# Patient Record
Sex: FEMALE | Race: BLACK OR AFRICAN AMERICAN | NOT HISPANIC OR LATINO | Employment: FULL TIME | URBAN - METROPOLITAN AREA
[De-identification: names, ages, dates, MRNs, and addresses within clinical notes are randomized per-mention and may not be internally consistent; named-entity substitution may affect disease eponyms.]

---

## 2017-11-27 ENCOUNTER — HOSPITAL ENCOUNTER (EMERGENCY)
Facility: HOSPITAL | Age: 52
Discharge: HOME | End: 2017-11-27
Attending: EMERGENCY MEDICINE

## 2017-11-27 VITALS
DIASTOLIC BLOOD PRESSURE: 70 MMHG | SYSTOLIC BLOOD PRESSURE: 115 MMHG | WEIGHT: 170 LBS | BODY MASS INDEX: 31.28 KG/M2 | HEART RATE: 86 BPM | RESPIRATION RATE: 16 BRPM | TEMPERATURE: 99 F | OXYGEN SATURATION: 100 % | HEIGHT: 62 IN

## 2017-11-27 DIAGNOSIS — M54.2 NECK PAIN, ACUTE: Primary | ICD-10-CM

## 2017-11-27 PROCEDURE — 99283 EMERGENCY DEPT VISIT LOW MDM: CPT | Mod: ,,, | Performed by: EMERGENCY MEDICINE

## 2017-11-27 PROCEDURE — 99283 EMERGENCY DEPT VISIT LOW MDM: CPT

## 2017-11-27 PROCEDURE — 25000003 PHARM REV CODE 250: Performed by: EMERGENCY MEDICINE

## 2017-11-27 RX ORDER — NAPROXEN 500 MG/1
500 TABLET ORAL
Status: COMPLETED | OUTPATIENT
Start: 2017-11-27 | End: 2017-11-27

## 2017-11-27 RX ORDER — NAPROXEN 500 MG/1
500 TABLET ORAL 2 TIMES DAILY WITH MEALS
Qty: 30 TABLET | Refills: 0 | Status: SHIPPED | OUTPATIENT
Start: 2017-11-27 | End: 2017-12-05 | Stop reason: ALTCHOICE

## 2017-11-27 RX ORDER — CYCLOBENZAPRINE HCL 10 MG
10 TABLET ORAL 3 TIMES DAILY PRN
Qty: 15 TABLET | Refills: 0 | Status: SHIPPED | OUTPATIENT
Start: 2017-11-27 | End: 2017-12-02

## 2017-11-27 RX ORDER — KETOROLAC TROMETHAMINE 30 MG/ML
30 INJECTION, SOLUTION INTRAMUSCULAR; INTRAVENOUS
Status: DISCONTINUED | OUTPATIENT
Start: 2017-11-27 | End: 2017-11-27

## 2017-11-27 RX ORDER — CYCLOBENZAPRINE HCL 10 MG
10 TABLET ORAL
Status: COMPLETED | OUTPATIENT
Start: 2017-11-27 | End: 2017-11-27

## 2017-11-27 RX ADMIN — NAPROXEN 500 MG: 500 TABLET ORAL at 10:11

## 2017-11-27 RX ADMIN — CYCLOBENZAPRINE HYDROCHLORIDE 10 MG: 10 TABLET, FILM COATED ORAL at 10:11

## 2017-11-28 NOTE — ED PROVIDER NOTES
Encounter Date: 11/27/2017       History     Chief Complaint   Patient presents with    Shoulder Pain     pt reports she was in the elevator about 30 minutes ago in the garage and the elevator dropped and jolted and pt c/c of left sided shoulder pain and right sided neck pain; no bleeding noted      52-year-old female with no past medical history presents for the evaluation of neck pain. Patient was in a parking garage elevator at her workplace when the elevator malfunctioned and dropped approximately 1 floor.  Upon elevator stopping her neck flexed and now she is having some left-sided neck pain.  Pain is located in the left trapezius and left paraspinous neck muscles. She denies any numbness or tingling in either of her arms.he denies hitting her head, loss of consciousness, or headache.  She has no other bodily pain.  Despite the chief complaint triaging shoulder pain, the patient does not have any shoulder pain.          Review of patient's allergies indicates:  Allergies not on file  History reviewed. No pertinent past medical history.  Past Surgical History:   Procedure Laterality Date    cyst removed Right      History reviewed. No pertinent family history.  Social History   Substance Use Topics    Smoking status: Never Smoker    Smokeless tobacco: Never Used    Alcohol use Yes      Comment: ocassional     Review of Systems   Constitutional: Negative for fever.   HENT: Negative for sore throat.    Respiratory: Negative for shortness of breath.    Cardiovascular: Negative for chest pain.   Gastrointestinal: Negative for nausea.   Genitourinary: Negative for dysuria.   Musculoskeletal: Positive for neck pain and neck stiffness. Negative for back pain.   Skin: Negative for rash.   Neurological: Negative for weakness.   Hematological: Does not bruise/bleed easily.       Physical Exam     Initial Vitals [11/27/17 1920]   BP Pulse Resp Temp SpO2   115/70 86 16 98.7 °F (37.1 °C) 100 %      MAP       85          Physical Exam    Vitals reviewed.  Constitutional: She appears well-developed and well-nourished. She is not diaphoretic. No distress.   HENT:   Head: Normocephalic and atraumatic.   Right Ear: External ear normal.   Left Ear: External ear normal.   Nose: Nose normal.   Eyes: Right eye exhibits no discharge. Left eye exhibits no discharge.   Neck: Neck supple. No thyromegaly present. No tracheal deviation present.   + TTP to the left trapezius and left paraspinous muscles. No C-spine tenderness. Full ROM of neck, but slow to move due to muscle pain/ stiffness   Cardiovascular: Normal rate, regular rhythm and normal heart sounds.   No murmur heard.  Pulmonary/Chest: Breath sounds normal. No stridor. No respiratory distress. She has no wheezes. She has no rales.   Abdominal: There is no tenderness.   Musculoskeletal: Normal range of motion. She exhibits tenderness. She exhibits no edema.   Full passive and active ROM ofn L and R  shoulder.  No midline back tenderness.    Neurological: She is alert and oriented to person, place, and time. She has normal strength. No cranial nerve deficit.   Normal gait, normal strength at 4 extremities.  Sensation and  strength intact in the left arm.   Skin: Skin is warm and dry. No erythema. No pallor.   Psychiatric: She has a normal mood and affect. Thought content normal.         ED Course   Procedures  Labs Reviewed - No data to display          Medical Decision Making:   Initial Assessment:   52-year-old female presents for evaluation of neck pain after an elevator incident  Differential Diagnosis:   Strain, radiculopathy, muscle pain, hyperflexion injury  ED Management:  Will treat pain Toradol and Flexeril.  Benefits and risk of not having imaging have discussed the patient.  She is comfortable not having any imaging done. Will discharge with rx for nsaids and flexeril. Pt is comfortable with this plan.                 Attending Attestation:   Physician Attestation  Statement for Resident:  As the supervising MD   Physician Attestation Statement: I have personally seen and examined this patient.   I agree with the above history. -: Left trapezius spasm status post fall on elevator.  Patient with no midline tenderness and no weakness to suggest fracture or radicular symptoms.  No carotid bruit or cranial nerve defects to suggest carotid dissection.  Overall very well-appearing.  Provided with indications to return.   As the supervising MD I agree with the above PE.    As the supervising MD I agree with the above treatment, course, plan, and disposition.                    ED Course      Clinical Impression:   The encounter diagnosis was Neck pain, acute.                           Steven Machuca MD  Resident  11/27/17 2202       Chad Hansen MD  11/27/17 2225

## 2017-12-01 ENCOUNTER — HOSPITAL ENCOUNTER (EMERGENCY)
Facility: HOSPITAL | Age: 52
Discharge: HOME | End: 2017-12-01
Attending: EMERGENCY MEDICINE
Payer: COMMERCIAL

## 2017-12-01 VITALS
BODY MASS INDEX: 31.28 KG/M2 | HEART RATE: 79 BPM | HEIGHT: 62 IN | DIASTOLIC BLOOD PRESSURE: 67 MMHG | RESPIRATION RATE: 17 BRPM | TEMPERATURE: 99 F | OXYGEN SATURATION: 98 % | SYSTOLIC BLOOD PRESSURE: 126 MMHG | WEIGHT: 170 LBS

## 2017-12-01 DIAGNOSIS — M25.551 ACUTE RIGHT HIP PAIN: ICD-10-CM
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
## 2017-12-01 DIAGNOSIS — S16.1XXD CERVICAL STRAIN, ACUTE, SUBSEQUENT ENCOUNTER: Primary | ICD-10-CM
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
## 2017-12-01 LAB
B-HCG UR QL: NEGATIVE
BACTERIA #/AREA URNS AUTO: NORMAL /HPF
BILIRUB UR QL STRIP: NEGATIVE
CLARITY UR REFRACT.AUTO: CLEAR
COLOR UR AUTO: ABNORMAL
CTP QC/QA: YES
GLUCOSE UR QL STRIP: NEGATIVE
HGB UR QL STRIP: ABNORMAL
KETONES UR QL STRIP: NEGATIVE
LEUKOCYTE ESTERASE UR QL STRIP: NEGATIVE
MICROSCOPIC COMMENT: NORMAL
NITRITE UR QL STRIP: NEGATIVE
PH UR STRIP: 7 [PH] (ref 5–8)
PROT UR QL STRIP: NEGATIVE
RBC #/AREA URNS AUTO: 1 /HPF (ref 0–4)
SP GR UR STRIP: 1 (ref 1–1.03)
SQUAMOUS #/AREA URNS AUTO: 4 /HPF
URN SPEC COLLECT METH UR: ABNORMAL
UROBILINOGEN UR STRIP-ACNC: NEGATIVE EU/DL
WBC #/AREA URNS AUTO: 1 /HPF (ref 0–5)

## 2017-12-01 PROCEDURE — 81001 URINALYSIS AUTO W/SCOPE: CPT

## 2017-12-01 PROCEDURE — 63600175 PHARM REV CODE 636 W HCPCS: Performed by: PHYSICIAN ASSISTANT

## 2017-12-01 PROCEDURE — 96372 THER/PROPH/DIAG INJ SC/IM: CPT

## 2017-12-01 PROCEDURE — 99284 EMERGENCY DEPT VISIT MOD MDM: CPT | Mod: ,,, | Performed by: PHYSICIAN ASSISTANT

## 2017-12-01 PROCEDURE — 81025 URINE PREGNANCY TEST: CPT | Performed by: EMERGENCY MEDICINE

## 2017-12-01 PROCEDURE — 99283 EMERGENCY DEPT VISIT LOW MDM: CPT | Mod: 25

## 2017-12-01 RX ORDER — ORPHENADRINE CITRATE 30 MG/ML
30 INJECTION INTRAMUSCULAR; INTRAVENOUS
Status: COMPLETED | OUTPATIENT
Start: 2017-12-01 | End: 2017-12-01

## 2017-12-01 RX ORDER — KETOROLAC TROMETHAMINE 30 MG/ML
10 INJECTION, SOLUTION INTRAMUSCULAR; INTRAVENOUS
Status: COMPLETED | OUTPATIENT
Start: 2017-12-01 | End: 2017-12-01

## 2017-12-01 RX ADMIN — ORPHENADRINE CITRATE 30 MG: 30 INJECTION INTRAMUSCULAR; INTRAVENOUS at 03:12

## 2017-12-01 RX ADMIN — KETOROLAC TROMETHAMINE 10 MG: 30 INJECTION, SOLUTION INTRAMUSCULAR at 03:12

## 2017-12-01 NOTE — ED TRIAGE NOTES
"Pt presents to the ED c/o bilateral hip pain since Monday. Pt states that she was at work "when the elevator dropped down from the eighth floor." Unsure how far it dropped. Pt states she was seen here Monday for her shoulders. Pt states xrays were done on Wednesday at her company's doctor who stated it was probably pain r/t the impact but nothing broken. Denies bladder/bowel incontinence.  "

## 2017-12-01 NOTE — ED PROVIDER NOTES
Encounter Date: 12/1/2017    SCRIBE #1 NOTE: I, Fay Thacker, am scribing for, and in the presence of,  Dr. Ayers. I have scribed the following portions of the note - the APC attestation.       History     Chief Complaint   Patient presents with    Back Pain     Pt presented to the ED via pov. Pt states on Monday she was at work when she got stuck in the elevator and the elevator dropped down. Pt c/o back pain. Pt alert. Pt denies loss of bladder and bowel control.     The patient presents to the ER c/o left sided neck pain and right hip pain. She states that 4 days ago she was onboard a parking garage elevator that malfunctioned and unexpectedly dropped approximately 1 floor to a sudden stop. She denies falling down, but describes being jolted. She states that she initially experienced left sided neck pain and came to the ER for evaluation. She states that she was diagnosed with muscle strain and prescribed NSAIDs and a muscle relaxer. She states that afterwards she gradually developed pain and soreness to her right hip. She states that she has been taking the medications, but her pains are uncontrolled. She denies any back pain. She denies any radiation of her pain. She denies any numbness, weakness, or loss of function. She states that the pain is worse with certain movements and postions.           Review of patient's allergies indicates:   Allergen Reactions    Codeine Itching     History reviewed. No pertinent past medical history.  Past Surgical History:   Procedure Laterality Date    cyst removed Right      History reviewed. No pertinent family history.  Social History   Substance Use Topics    Smoking status: Never Smoker    Smokeless tobacco: Never Used    Alcohol use Yes      Comment: ocassional     Review of Systems   Constitutional: Negative for chills and fever.   Respiratory: Negative for shortness of breath.    Cardiovascular: Negative for chest pain.   Gastrointestinal: Negative for  abdominal pain, diarrhea, nausea and vomiting.   Genitourinary: Negative for decreased urine volume, dysuria, flank pain, frequency, hematuria and pelvic pain.   Musculoskeletal: Positive for arthralgias and neck pain. Negative for back pain and joint swelling.   Skin: Negative for color change and wound.   Neurological: Negative for dizziness, syncope, weakness, light-headedness, numbness and headaches.   Psychiatric/Behavioral: Negative for confusion.       Physical Exam     Initial Vitals [12/01/17 1258]   BP Pulse Resp Temp SpO2   119/75 93 17 98.5 °F (36.9 °C) 98 %      MAP       89.67         Physical Exam    Nursing note and vitals reviewed.  Constitutional: She appears well-developed and well-nourished.   Alert and ambulatory. She is accompanied by her .    HENT:   Head: Atraumatic.   Eyes: Conjunctivae are normal.   Neck:   Left Trapezius and left cervical paraspinal muscle tenderness, diffuse, mild. No midline or focal vertebral point tenderness.    Cardiovascular: Normal rate and intact distal pulses.   Pulmonary/Chest: No respiratory distress.   Abdominal: Soft. She exhibits no distension.   Musculoskeletal:   Palpation over right SI joint reproduces pain. Flexion of right hip reproduces pain. No L-spine or T-spine tenderness.     Neurological: She is alert and oriented to person, place, and time. She has normal strength. No cranial nerve deficit or sensory deficit.   5/5 strength extremities x 4. No focal weakness or deficit.    Skin: Skin is warm and dry. No rash noted.   Psychiatric: She has a normal mood and affect. Her behavior is normal.         ED Course   Procedures  Labs Reviewed   URINALYSIS - Abnormal; Notable for the following:        Result Value    Occult Blood UA 2+ (*)     All other components within normal limits    Narrative:     EXTRA URINE   URINALYSIS MICROSCOPIC    Narrative:     EXTRA URINE   POCT URINE PREGNANCY     Results for orders placed or performed during the hospital  encounter of 12/01/17   Urinalysis   Result Value Ref Range    Specimen UA Urine, Clean Catch     Color, UA Straw Yellow, Straw, Kailyn    Appearance, UA Clear Clear    pH, UA 7.0 5.0 - 8.0    Specific Gravity, UA 1.005 1.005 - 1.030    Protein, UA Negative Negative    Glucose, UA Negative Negative    Ketones, UA Negative Negative    Bilirubin (UA) Negative Negative    Occult Blood UA 2+ (A) Negative    Nitrite, UA Negative Negative    Urobilinogen, UA Negative <2.0 EU/dL    Leukocytes, UA Negative Negative   Urinalysis Microscopic   Result Value Ref Range    RBC, UA 1 0 - 4 /hpf    WBC, UA 1 0 - 5 /hpf    Bacteria, UA Rare None-Occ /hpf    Squam Epithel, UA 4 /hpf    Microscopic Comment SEE COMMENT    POCT urine pregnancy   Result Value Ref Range    POC Preg Test, Ur Negative Negative     Acceptable Yes                Medical Decision Making:   History:   Old Medical Records: I decided to obtain old medical records.  Initial Assessment:   Pt c/o left neck pain and right hip pain 4 days s/p traumatic injury due to elevator malfunction.   Differential Diagnosis:   Muscle strain, sprain, fracture, tendonitis, fracture, dislocation, Radiculopathy, Bursitis, neuropathy, inflammation, infection, etc   Clinical Tests:   Radiological Study: Ordered and Reviewed  Other:   I have discussed this case with another health care provider.       <> Summary of the Discussion: I discussed the case in detail with the ER attending physician.     Additional MDM:   X-Rays: I have independently interpreted X-Ray(s) - see notes.     Imaging Results          X-Ray Cervical Spine AP And Lateral (Final result)  Result time 12/01/17 14:01:39    Final result by Albino Ch MD (12/01/17 14:01:39)                 Impression:      Cervical spondylosis.  No evidence for acute fracture, bone destruction, or dislocation.      Electronically signed by: ALBINO CH MD  Date:     12/01/17  Time:    14:01              Narrative:     AP, lateral, and odontoid views of the cervical spine were obtained. There is straightening of the normal cervical lordosis. Posterior vertebral alignment is satisfactory. Vertebral body heights are well-maintained. There is no evidence for acute fracture or bone destruction. There is disc space narrowing at the C5-6 and C6-7 levels with anterior osteophyte formation. The odontoid appears intact. Prevertebral soft tissues are unremarkable.                             X-Ray Sacroiliac Joints 3 Views (Final result)  Result time 12/01/17 14:00:33    Final result by Dary Justin MD (12/01/17 14:00:33)                 Impression:        No acute displaced fracture or dislocation identified.      Electronically signed by: DARY JUSTIN MD, MD  Date:     12/01/17  Time:    14:00              Narrative:    COMPARISON: Right hip series same day    FINDINGS: 3 views right SI joint series.        Overall alignment is within normal limits.   No acute displaced fracture, dislocation, or destructive osseous process identified.  Minimal degenerative change at the pubic symphysis and bilateral SI joints.   No subcutaneous emphysema or radiodense retained foreign body. Pelvic phleboliths noted.                             X-Ray Hip 2 View Right (Final result)  Result time 12/01/17 13:59:56    Final result by Albino Villalobos MD (12/01/17 13:59:56)                 Impression:      No evidence for acute fracture, bone destruction, or dislocation.      Electronically signed by: ALBINO VILLALOBOS MD  Date:     12/01/17  Time:    13:59              Narrative:    AP radiograph of the pelvis and a frog leg lateral radiograph of the right hip were obtained. The bones are intact. There is no evidence for acute fracture or bone destruction. Hip joints and sacroiliac joints appear well-maintained. There are phleboliths within the pelvis. Soft tissues are otherwise unremarkable.                                   Scribe Attestation:   Scribe #1:  I performed the above scribed service and the documentation accurately describes the services I performed. I attest to the accuracy of the note.    Attending Attestation:     Physician Attestation Statement for NP/PA:   I discussed this assessment and plan of this patient with the NP/PA, but I did not personally examine the patient. The face to face encounter was performed by the NP/PA.    Other NP/PA Attestation Additions:      Medical Decision Making: Pt with back pain.                 ED Course      Clinical Impression:   The primary encounter diagnosis was Cervical strain, acute, subsequent encounter. A diagnosis of Acute right hip pain was also pertinent to this visit.    Disposition:   Disposition: Discharged  Condition: Stable                        Alfredo Rodríguez PA-C  12/01/17 1603

## 2017-12-05 ENCOUNTER — OFFICE VISIT (OUTPATIENT)
Dept: OCCUPATIONAL MEDICINE | Facility: CLINIC | Age: 52
End: 2017-12-05
Payer: COMMERCIAL

## 2017-12-05 VITALS
SYSTOLIC BLOOD PRESSURE: 130 MMHG | BODY MASS INDEX: 31.28 KG/M2 | DIASTOLIC BLOOD PRESSURE: 90 MMHG | TEMPERATURE: 98 F | HEIGHT: 62 IN | HEART RATE: 92 BPM | WEIGHT: 170 LBS | RESPIRATION RATE: 12 BRPM

## 2017-12-05 DIAGNOSIS — S73.101A HIP SPRAIN, RIGHT, INITIAL ENCOUNTER: ICD-10-CM

## 2017-12-05 DIAGNOSIS — S33.5XXA LUMBAR SPRAIN, INITIAL ENCOUNTER: Primary | ICD-10-CM

## 2017-12-05 DIAGNOSIS — S13.9XXA NECK SPRAIN, INITIAL ENCOUNTER: ICD-10-CM

## 2017-12-05 PROCEDURE — 99204 OFFICE O/P NEW MOD 45 MIN: CPT | Mod: S$GLB,,, | Performed by: PREVENTIVE MEDICINE

## 2017-12-05 RX ORDER — ORPHENADRINE CITRATE 100 MG/1
100 TABLET, EXTENDED RELEASE ORAL 2 TIMES DAILY
Qty: 60 TABLET | Refills: 1 | Status: SHIPPED | OUTPATIENT
Start: 2017-12-05 | End: 2018-12-05

## 2017-12-05 RX ORDER — MELOXICAM 7.5 MG/1
7.5 TABLET ORAL 2 TIMES DAILY WITH MEALS
Qty: 30 TABLET | Refills: 1 | Status: SHIPPED | OUTPATIENT
Start: 2017-12-05 | End: 2021-04-09

## 2017-12-05 RX ORDER — CYCLOBENZAPRINE HCL 10 MG
10 TABLET ORAL 3 TIMES DAILY PRN
COMMUNITY
End: 2021-03-31 | Stop reason: SDUPTHER

## 2017-12-05 NOTE — LETTER
[facility] [address] [place] [address]  Phone: [phone]  Fax: [phone]    Pt Name: Saadia Cancino  Injury Date: 11/27/17   Employee ID:  Date of Treatment: 12/05/2017   Company: [employer]            Appointment Time: 09:25 AM Arrived:  9:40 AM CST   Physician: Lars Villalpando MD DEPARTED: 11:30 AM           Office Treatment: Saadia was seen today for hip pain.    Diagnoses and all orders for this visit:  EXAM, RECORDS REVIEW, REGULAR DUTY    Lumbar sprain, initial encounter  -     orphenadrine (NORFLEX) 100 mg tablet; Take 1 tablet (100 mg total) by mouth 2 (two) times daily.  Neck sprain, initial encounter  -     meloxicam (MOBIC) 7.5 MG tablet; Take 1 tablet (7.5 mg total) by mouth 2 (two) times daily with meals.  Hip sprain, right, initial encounter     Patient Instructions: Daily home exercises/warm soaks (Both neck and back exercises demonstrated in the office)    Restrictions: NONE ; Regular Duty        Return Appointment: 12/12/2017 @ 1045

## 2017-12-05 NOTE — PROGRESS NOTES
Subjective:       Patient ID: Saadia Cancino is a 52 y.o. female.    Chief Complaint: Hip Pain (BILATERAL HIP PAIN FROM INJURY ON 11/27/17 )    Pt works in the [employer] department at [employer].  She was got in the elevator to go home on 11/27/17 on the first floor when the elevator shot up to the 8th floor and the doors cracked open then the elevator free fell then stopped.  She describes the incident as a jolt through her body.  She went to the ER on 11/27/17 and again on 12/01/17.  She reports pain in both hips and around her abdomen.  There is shooting pain when she lays down.  She say her neck is feeling better but is most concerned with the bilateral hip pain.  CT      Hip Pain    The incident occurred 5 to 7 days ago. The incident occurred at work. The pain is present in the left hip and right hip. The quality of the pain is described as aching, shooting, stabbing, cramping and burning. The pain is at a severity of 4/10. The pain is mild. The pain has been constant since onset. Associated symptoms include muscle weakness. She reports no foreign bodies present. The symptoms are aggravated by movement, palpation and weight bearing. She has tried NSAIDs, non-weight bearing and rest for the symptoms. The treatment provided no relief.     Review of Systems   Musculoskeletal: Positive for back pain, muscle cramps, muscle weakness, neck pain and stiffness.       Objective:      Physical Exam   Constitutional: She appears well-developed and well-nourished.   HENT:   Head: Normocephalic and atraumatic.   Eyes: Pupils are equal, round, and reactive to light.   Neck: Normal range of motion.   Cardiovascular: Normal rate.    Pulmonary/Chest: Effort normal.   Musculoskeletal:        Cervical back: She exhibits decreased range of motion, tenderness, pain and spasm. She exhibits no bony tenderness, no swelling, no edema, no deformity, no laceration and normal pulse.        Lumbar back: She exhibits  decreased range of motion, tenderness and pain. She exhibits no bony tenderness, no swelling, no edema, no deformity, no laceration, no spasm and normal pulse.        Back:    Pain about neck and back with palpation. Pain with flexion, extension and rotation of the neck.   Pain with flexion of low back on the right to 45, extension to 25 and lateral bends to 25 degrees. No motor or sensory deficits.   Neurological: She is alert.   No focal neurologic deficits   Skin: Skin is warm.   Psychiatric: She has a normal mood and affect. Her behavior is normal. Judgment and thought content normal.   Nursing note and vitals reviewed.      Assessment:       1. Lumbar sprain, initial encounter    2. Neck sprain, initial encounter    3. Hip sprain, right, initial encounter        Plan:           Medications Ordered This Encounter      meloxicam (MOBIC) 7.5 MG tablet          Sig: Take 1 tablet (7.5 mg total) by mouth 2 (two) times daily with meals.          Dispense:  30 tablet          Refill:  1      orphenadrine (NORFLEX) 100 mg tablet          Sig: Take 1 tablet (100 mg total) by mouth 2 (two) times daily.          Dispense:  60 tablet          Refill:  1  Patient Instructions: Daily home exercises/warm soaks (Both neck and back exercises demonstrated in the office)   Restrictions: Regular Duty  Return in about 7 days (around 12/12/2017).

## 2017-12-14 ENCOUNTER — OFFICE VISIT (OUTPATIENT)
Dept: OCCUPATIONAL MEDICINE | Facility: CLINIC | Age: 52
End: 2017-12-14
Payer: COMMERCIAL

## 2017-12-14 DIAGNOSIS — S33.5XXD LUMBAR SPRAIN, SUBSEQUENT ENCOUNTER: Primary | ICD-10-CM

## 2017-12-14 DIAGNOSIS — M54.16 LUMBAR BACK PAIN WITH RADICULOPATHY AFFECTING RIGHT LOWER EXTREMITY: ICD-10-CM

## 2017-12-14 DIAGNOSIS — M54.16 LUMBAR BACK PAIN WITH RADICULOPATHY AFFECTING LEFT LOWER EXTREMITY: ICD-10-CM

## 2017-12-14 DIAGNOSIS — S13.9XXD NECK SPRAIN, SUBSEQUENT ENCOUNTER: ICD-10-CM

## 2017-12-14 DIAGNOSIS — S76.011D HIP STRAIN, RIGHT, SUBSEQUENT ENCOUNTER: ICD-10-CM

## 2017-12-14 PROBLEM — S33.5XXA LUMBAR SPRAIN: Status: ACTIVE | Noted: 2017-12-14

## 2017-12-14 PROCEDURE — 99213 OFFICE O/P EST LOW 20 MIN: CPT | Mod: S$GLB,,, | Performed by: NURSE PRACTITIONER

## 2017-12-14 NOTE — PROGRESS NOTES
Subjective:       Patient ID: Saadia Cancino is a 52 y.o. female.    Chief Complaint: Hip Pain (FOLLOW UP FROM 11/27/17 ) and Shoulder Pain (FOLLOW UP FROM 11/27/17)    Pt is here for a FOLLOW UP on an injury that occurred on 11/27/17.  She states substantial improvement to her LEFT SHOULDER and NECK injury but very little improvement on her LEFT HIP / BACK/ ABDOMEN pain.  CT      Hip Pain    The incident occurred more than 1 week ago. The incident occurred at work. The pain is present in the left hip. The quality of the pain is described as aching and cramping. The pain is at a severity of 6/10. The pain is mild. The pain has been fluctuating since onset. Associated symptoms include numbness and tingling. She reports no foreign bodies present. The symptoms are aggravated by weight bearing and palpation. She has tried nothing for the symptoms. The treatment provided mild relief.   Shoulder Pain    The pain is present in the neck and left shoulder. This is a recurrent problem. The current episode started 1 to 4 weeks ago. The problem occurs intermittently. The problem has been waxing and waning. The quality of the pain is described as dull. The pain is at a severity of 3/10. The pain is mild. Associated symptoms include numbness, stiffness and tingling. Pertinent negatives include no fever. The treatment provided mild relief.     Review of Systems   Constitution: Negative for chills and fever.   Eyes: Negative for blurred vision, double vision and pain.   Cardiovascular: Negative for chest pain, dyspnea on exertion, leg swelling and palpitations.   Respiratory: Negative for cough and shortness of breath.    Endocrine: Negative for polydipsia, polyphagia and polyuria.   Hematologic/Lymphatic: Does not bruise/bleed easily.   Skin: Negative for color change and suspicious lesions.   Musculoskeletal: Positive for back pain, joint pain, muscle cramps, muscle weakness, neck pain and stiffness.   Genitourinary: Negative  for bladder incontinence, dysuria, flank pain and pelvic pain.   Neurological: Positive for numbness and tingling. Negative for difficulty with concentration, dizziness, light-headedness, sensory change and vertigo.   Psychiatric/Behavioral: Negative for depression and memory loss. The patient is not nervous/anxious.    Allergic/Immunologic: Negative for persistent infections.       Objective:      Physical Exam   Constitutional: She is oriented to person, place, and time. Vital signs are normal. She appears well-developed and well-nourished.   HENT:   Head: Normocephalic and atraumatic.   Eyes: Conjunctivae and EOM are normal. Pupils are equal, round, and reactive to light.   Neck: Neck supple. No muscular tenderness present. Normal range of motion present.   Cardiovascular: Normal rate and regular rhythm.    Pulmonary/Chest: Effort normal.   Abdominal: Soft. Bowel sounds are normal. There is no tenderness. There is no guarding.   Musculoskeletal: She exhibits tenderness.        Left shoulder: She exhibits tenderness.        Left elbow: Tenderness found.        Left wrist: Normal.        Right hip: She exhibits decreased range of motion, decreased strength and tenderness. She exhibits no swelling.        Left hip: She exhibits decreased range of motion, decreased strength and tenderness.        Cervical back: She exhibits decreased range of motion, tenderness and pain.        Thoracic back: Normal.        Lumbar back: She exhibits decreased range of motion, tenderness, pain and spasm. She exhibits no swelling.        Back:         Legs:  Neurological: She is alert and oriented to person, place, and time. She displays normal reflexes. No cranial nerve deficit or sensory deficit. She exhibits normal muscle tone. Coordination and gait (gait slow but steady) normal. GCS eye subscore is 4. GCS verbal subscore is 5. GCS motor subscore is 6.   Reflex Scores:       Patellar reflexes are 2+ on the right side and 2+ on the  left side.       Achilles reflexes are 2+ on the right side and 2+ on the left side.  Skin: Skin is warm and dry. Capillary refill takes less than 2 seconds.   Psychiatric: Her speech is normal and behavior is normal. Judgment and thought content normal. Cognition and memory are normal. She exhibits a depressed mood.   Very tearful when discussing injury        Assessment:       1. Lumbar sprain, subsequent encounter    2. Neck sprain, subsequent encounter    3. Hip strain, right, subsequent encounter    4. Lumbar back pain with radiculopathy affecting left lower extremity    5. Lumbar back pain with radiculopathy affecting right lower extremity        Plan:           Saadia was seen today for hip pain and shoulder pain.    Diagnoses and all orders for this visit:    Lumbar sprain, subsequent encounter    Neck sprain, subsequent encounter    Hip strain, right, subsequent encounter    Lumbar back pain with radiculopathy affecting left lower extremity    Lumbar back pain with radiculopathy affecting right lower extremity    Continue medication as directed and as needed  At home exercises as demonstrated and directed daily  Call with any questions and or concern  Consider Physical therapy next visit will discuss with Dr Villalpando.  Patient requesting a MRI of the lumbar spine- we reassess on 12/19/2017    Patient Instructions: Attention not to aggravate affected area, Daily home exercises/warm soaks   Restrictions: Sit or stand as needed, No lifting/pushing/pulling more than 10 lbs, Avoid frequent bending/lifting/twisting, Avoid climbing/kneeling/squatting  Return in about 5 days (around 12/19/2017).

## 2017-12-14 NOTE — LETTER
[facility]  [address] [address]  Phone: [phone]  Fax: [phone]    Pt Name: Saadia Cancino  Injury Date: 11/27/17   Employee ID:  Date of Treatment: 12/14/2017   Company: [employer] reference to record [employer] [id number]            Appointment Time: 02:45 PM Arrived:  3:00 PM CST   Physician: Bea Alcantar NP Departed: 1625 pm           Office Treatment: Saadia was seen today for hip pain and shoulder pain.    Diagnoses and all orders for this visit:  EXAM, LIGHT DUTY    Lumbar sprain, subsequent encounter  Neck sprain, subsequent encounter  Hip strain, right, subsequent encounter  Lumbar back pain with radiculopathy affecting left lower extremity  Lumbar back pain with radiculopathy affecting right lower extremity     Patient Instructions: Attention not to aggravate affected area, Daily home exercises/warm soaks    Restrictions: Sit or stand as needed, No lifting/pushing/pulling more than 10 lbs, Avoid frequent bending/lifting/twisting, Avoid climbing/kneeling/squatting       Return Appointment: 01/19/18 @ 1:00 PM

## 2017-12-14 NOTE — PATIENT INSTRUCTIONS
Relieving Back Pain  Back pain is a common problem. You can strain back muscles by lifting too much weight or just by moving the wrong way. Back strain can be uncomfortable, even painful. And it can take weeks or months to improve. To help yourself feel better and prevent future back strains, try these tips.  Important Note: Do not give aspirin to children or teens without first discussing it with your healthcare provider.      ? Ice    Ice reduces muscle pain and swelling. It helps most during the first 24 to 48 hours after an injury.  · Wrap an ice pack or a bag of frozen peas in a thin towel. (Never place ice directly on your skin.)  · Place the ice where your back hurts the most.  · Dont ice for more than 20 minutes at a time.  · You can use ice several times a day.  ? Medicines  Over-the-counter pain relievers can include acetaminophen and anti-inflammatory medicines, which includes aspirin or ibuprofen. They can help ease discomfort. Some also reduce swelling.  · Tell your healthcare provider about any medicines you are already taking.  · Take medicines only as directed.  ? Heat  After the first 48 hours, heat can relax sore muscles and improve blood flow.  · Try a warm bath or shower. Or use a heating pad set on low. To prevent a burn, keep a cloth between you and the heating pad.  · Dont use a heating pad for more than 15 minutes at a time. Never sleep on a heating pad.  Date Last Reviewed: 9/1/2015  © 8019-7006 [employer]. [address] [place] [address]. All rights reserved. This information is not intended as a substitute for professional medical care. Always follow your healthcare professional's instructions.        Back Safety: Poor Posture Hurts  An unhealthy spine often starts with bad habits. Poor movement patterns and posture problems are common causes of back pain. Disk, bone, nerve, and soft tissue problems can all be affected by poor posture. They can lead to pain,  stiffness, and other symptoms.    Poor posture backfires  Poor posture can cause pain. Too much slouching puts increased pressure on the disks. An excessive lumbar curve can overload and inflame the vertebrae. As a result, the back muscles may tighten or spasm to splint and protect the spine. This adds to the pain you feel.    Proper posture: The key to safe movement  Your spine bears your weight throughout the day. This is true whether youre sleeping, standing, or bending. Certain positions strain your spine more than others. But by maintaining proper posture in all positions, you can reduce the stress on your spine.       To improve your standing posture, follow these steps:  · Breathe deeply.  · Relax your shoulders, hips, and ankles. · Think of the ears, shoulders, hips, and ankles as a series of dots. Now, adjust your body to connect the dots in a straight line.  · Tuck your buttocks in just a bit if you need to.      Date Last Reviewed: 10/28/2015  © 6010-5763 [employer]. [address] [place] [address]. All rights reserved. This information is not intended as a substitute for professional medical care. Always follow your healthcare professional's instructions.        Hip Strain    You have a strain of the muscles around the hip joint. A muscle strain is a stretching or tearing of muscle fibers. This causes pain, especially when you move that muscle. There may also be some swelling and bruising.  Home care  · Stay off the injured leg as much as possible until you can walk on it without pain. If you have a lot of pain with walking, crutches or a walker may be prescribed. These can be rented or purchased at many pharmacies and surgical or orthopedic supply stores. Follow your healthcare provider's advice regarding when to begin putting weight on that leg.  · Apply an ice pack over the injured area for 15 to 20 minutes every 3 to 6 hours. You should do this for the first 24 to 48  hours. You can make an ice pack by filling a plastic bag that seals at the top with ice cubes and then wrapping it with a thin towel. Be careful not to injure your skin with the ice treatments. Ice should never be applied directly to skin. Continue the use of ice packs for relief of pain and swelling as needed. After 48 hours, apply heat (warm shower or warm bath) for 15 to 20 minutes several times a day, or alternate ice and heat.  · You may use over-the-counter pain medicine to control pain, unless another pain medicine was prescribed. If you have chronic liver or kidney disease or ever had a stomach ulcer or GI bleeding, talk with your healthcare provider beforeusing these medicines.  · If you play sports, you may resume these activities when you are able to hop and run on the injured leg without pain.  Follow-up care  Follow up with your healthcare provider, or as advised. If your symptoms do not begin to get better after a week, more tests may be needed.  If X-rays were taken, you will be told of any new findings that may affect your care.  When to seek medical advice  Call your healthcare provider right away if any of these occur:  · Increased swelling or bruising  · Increased pain  · Losing the ability to put weight on the injured side  Date Last Reviewed: 11/19/2015 © 2000-2017 [employer]. [address] [place] [address], [place] [address]. All rights reserved. This information is not intended as a substitute for professional medical care. Always follow your healthcare professional's instructions.        Back Safety: Sitting  Sitting can strain your back if you dont do it properly. Learn the right moves to protect your back.      Sitting down  Follow these steps to sit down. Reverse them to get back up.  · Make sure the chair is behind you.  · Place one foot slightly behind the other.  · Tighten your stomach muscles. Bend forward from the hips, keeping your back straight.  · Hold the armrests or  sides of the seat for support.  · Bend your knees. Use your leg muscles to lower yourself onto the seat.  · Scoot back in the seat until you are comfortable.    Sitting safely  · Keep your feet flat. Dont cross your legs.  · A low footrest (no higher than 8 inches) may help.  · A support behind your lower back or between your shoulder blades can help make you more comfortable.  · When sitting for long periods, change your position from time to time. Also, get up every half hour and move around.  Date Last Reviewed: 8/31/2015  © 3902-8654 [employer] [address] [place] [address], [place] [address]. All rights reserved. This information is not intended as a substitute for professional medical care. Always follow your healthcare professional's instructions.

## 2021-03-02 ENCOUNTER — TELEPHONE (OUTPATIENT)
Dept: HEMATOLOGY/ONCOLOGY | Facility: CLINIC | Age: 56
End: 2021-03-02

## 2021-03-20 ENCOUNTER — IMMUNIZATION (OUTPATIENT)
Dept: PRIMARY CARE CLINIC | Facility: CLINIC | Age: 56
End: 2021-03-20
Payer: COMMERCIAL

## 2021-03-20 DIAGNOSIS — Z23 NEED FOR VACCINATION: Primary | ICD-10-CM

## 2021-03-20 PROCEDURE — 91300 PR SARS-COV- 2 COVID-19 VACCINE, NO PRSV, 30MCG/0.3ML, IM: CPT | Mod: S$GLB,,, | Performed by: INTERNAL MEDICINE

## 2021-03-20 PROCEDURE — 0001A PR IMMUNIZ ADMIN, SARS-COV-2 COVID-19 VACC, 30MCG/0.3ML, 1ST DOSE: ICD-10-PCS | Mod: CV19,S$GLB,, | Performed by: INTERNAL MEDICINE

## 2021-03-20 PROCEDURE — 0001A PR IMMUNIZ ADMIN, SARS-COV-2 COVID-19 VACC, 30MCG/0.3ML, 1ST DOSE: CPT | Mod: CV19,S$GLB,, | Performed by: INTERNAL MEDICINE

## 2021-03-20 PROCEDURE — 91300 PR SARS-COV- 2 COVID-19 VACCINE, NO PRSV, 30MCG/0.3ML, IM: ICD-10-PCS | Mod: S$GLB,,, | Performed by: INTERNAL MEDICINE

## 2021-03-20 RX ADMIN — Medication 0.3 ML: at 08:03

## 2021-03-31 ENCOUNTER — OFFICE VISIT (OUTPATIENT)
Dept: HEMATOLOGY/ONCOLOGY | Facility: CLINIC | Age: 56
End: 2021-03-31
Payer: COMMERCIAL

## 2021-03-31 VITALS
SYSTOLIC BLOOD PRESSURE: 106 MMHG | DIASTOLIC BLOOD PRESSURE: 63 MMHG | WEIGHT: 188.69 LBS | OXYGEN SATURATION: 99 % | BODY MASS INDEX: 34.72 KG/M2 | HEIGHT: 62 IN | HEART RATE: 86 BPM

## 2021-03-31 DIAGNOSIS — Z00.00 VISIT FOR PREVENTIVE HEALTH EXAMINATION: ICD-10-CM

## 2021-03-31 DIAGNOSIS — G62.0 PERIPHERAL NEUROPATHY DUE TO CHEMOTHERAPY: ICD-10-CM

## 2021-03-31 DIAGNOSIS — D72.810 LYMPHOPENIA: ICD-10-CM

## 2021-03-31 DIAGNOSIS — T45.1X5A PERIPHERAL NEUROPATHY DUE TO CHEMOTHERAPY: ICD-10-CM

## 2021-03-31 DIAGNOSIS — C50.112 MALIGNANT NEOPLASM OF CENTRAL PORTION OF LEFT BREAST IN FEMALE, ESTROGEN RECEPTOR POSITIVE: Primary | ICD-10-CM

## 2021-03-31 DIAGNOSIS — Z17.0 MALIGNANT NEOPLASM OF CENTRAL PORTION OF LEFT BREAST IN FEMALE, ESTROGEN RECEPTOR POSITIVE: Primary | ICD-10-CM

## 2021-03-31 PROCEDURE — 99999 PR PBB SHADOW E&M-EST. PATIENT-LVL III: CPT | Mod: PBBFAC,,, | Performed by: INTERNAL MEDICINE

## 2021-03-31 PROCEDURE — 99215 OFFICE O/P EST HI 40 MIN: CPT | Mod: S$GLB,,, | Performed by: INTERNAL MEDICINE

## 2021-03-31 PROCEDURE — 1126F PR PAIN SEVERITY QUANTIFIED, NO PAIN PRESENT: ICD-10-PCS | Mod: S$GLB,,, | Performed by: INTERNAL MEDICINE

## 2021-03-31 PROCEDURE — 3008F PR BODY MASS INDEX (BMI) DOCUMENTED: ICD-10-PCS | Mod: CPTII,S$GLB,, | Performed by: INTERNAL MEDICINE

## 2021-03-31 PROCEDURE — 99999 PR PBB SHADOW E&M-EST. PATIENT-LVL III: ICD-10-PCS | Mod: PBBFAC,,, | Performed by: INTERNAL MEDICINE

## 2021-03-31 PROCEDURE — 99215 PR OFFICE/OUTPT VISIT, EST, LEVL V, 40-54 MIN: ICD-10-PCS | Mod: S$GLB,,, | Performed by: INTERNAL MEDICINE

## 2021-03-31 PROCEDURE — 1126F AMNT PAIN NOTED NONE PRSNT: CPT | Mod: S$GLB,,, | Performed by: INTERNAL MEDICINE

## 2021-03-31 PROCEDURE — 3008F BODY MASS INDEX DOCD: CPT | Mod: CPTII,S$GLB,, | Performed by: INTERNAL MEDICINE

## 2021-03-31 RX ORDER — GABAPENTIN 300 MG/1
300 CAPSULE ORAL DAILY
COMMUNITY
Start: 2021-03-02 | End: 2022-08-10 | Stop reason: SDUPTHER

## 2021-03-31 RX ORDER — TIZANIDINE 4 MG/1
4 TABLET ORAL NIGHTLY PRN
COMMUNITY
Start: 2021-03-06

## 2021-04-01 ENCOUNTER — TELEPHONE (OUTPATIENT)
Dept: FAMILY MEDICINE | Facility: CLINIC | Age: 56
End: 2021-04-01

## 2021-04-09 ENCOUNTER — OFFICE VISIT (OUTPATIENT)
Dept: FAMILY MEDICINE | Facility: CLINIC | Age: 56
End: 2021-04-09
Payer: COMMERCIAL

## 2021-04-09 VITALS
BODY MASS INDEX: 34.25 KG/M2 | SYSTOLIC BLOOD PRESSURE: 114 MMHG | DIASTOLIC BLOOD PRESSURE: 78 MMHG | WEIGHT: 186.13 LBS | HEIGHT: 62 IN | OXYGEN SATURATION: 98 % | HEART RATE: 90 BPM

## 2021-04-09 DIAGNOSIS — D72.810 LYMPHOPENIA: ICD-10-CM

## 2021-04-09 DIAGNOSIS — Z17.0 MALIGNANT NEOPLASM OF CENTRAL PORTION OF LEFT BREAST IN FEMALE, ESTROGEN RECEPTOR POSITIVE: ICD-10-CM

## 2021-04-09 DIAGNOSIS — Z00.00 VISIT FOR PREVENTIVE HEALTH EXAMINATION: ICD-10-CM

## 2021-04-09 DIAGNOSIS — L81.9 HYPERPIGMENTATION OF SKIN: ICD-10-CM

## 2021-04-09 DIAGNOSIS — Z13.220 ENCOUNTER FOR LIPID SCREENING FOR CARDIOVASCULAR DISEASE: Primary | ICD-10-CM

## 2021-04-09 DIAGNOSIS — G62.0 PERIPHERAL NEUROPATHY DUE TO CHEMOTHERAPY: ICD-10-CM

## 2021-04-09 DIAGNOSIS — Z13.6 ENCOUNTER FOR LIPID SCREENING FOR CARDIOVASCULAR DISEASE: Primary | ICD-10-CM

## 2021-04-09 DIAGNOSIS — T45.1X5A PERIPHERAL NEUROPATHY DUE TO CHEMOTHERAPY: ICD-10-CM

## 2021-04-09 DIAGNOSIS — C50.112 MALIGNANT NEOPLASM OF CENTRAL PORTION OF LEFT BREAST IN FEMALE, ESTROGEN RECEPTOR POSITIVE: ICD-10-CM

## 2021-04-09 PROCEDURE — 99999 PR PBB SHADOW E&M-EST. PATIENT-LVL IV: CPT | Mod: PBBFAC,,, | Performed by: FAMILY MEDICINE

## 2021-04-09 PROCEDURE — 99214 PR OFFICE/OUTPT VISIT, EST, LEVL IV, 30-39 MIN: ICD-10-PCS | Mod: S$GLB,,, | Performed by: FAMILY MEDICINE

## 2021-04-09 PROCEDURE — 99214 OFFICE O/P EST MOD 30 MIN: CPT | Mod: S$GLB,,, | Performed by: FAMILY MEDICINE

## 2021-04-09 PROCEDURE — 1126F AMNT PAIN NOTED NONE PRSNT: CPT | Mod: S$GLB,,, | Performed by: FAMILY MEDICINE

## 2021-04-09 PROCEDURE — 99999 PR PBB SHADOW E&M-EST. PATIENT-LVL IV: ICD-10-PCS | Mod: PBBFAC,,, | Performed by: FAMILY MEDICINE

## 2021-04-09 PROCEDURE — 3008F BODY MASS INDEX DOCD: CPT | Mod: CPTII,S$GLB,, | Performed by: FAMILY MEDICINE

## 2021-04-09 PROCEDURE — 1126F PR PAIN SEVERITY QUANTIFIED, NO PAIN PRESENT: ICD-10-PCS | Mod: S$GLB,,, | Performed by: FAMILY MEDICINE

## 2021-04-09 PROCEDURE — 3008F PR BODY MASS INDEX (BMI) DOCUMENTED: ICD-10-PCS | Mod: CPTII,S$GLB,, | Performed by: FAMILY MEDICINE

## 2021-04-09 RX ORDER — NAPROXEN 500 MG/1
500 TABLET ORAL NIGHTLY
COMMUNITY
Start: 2021-04-06 | End: 2021-04-09 | Stop reason: SDUPTHER

## 2021-04-09 RX ORDER — NAPROXEN 500 MG/1
500 TABLET ORAL 2 TIMES DAILY PRN
Qty: 60 TABLET | Refills: 1 | Status: SHIPPED | OUTPATIENT
Start: 2021-04-09

## 2021-04-11 ENCOUNTER — IMMUNIZATION (OUTPATIENT)
Dept: PRIMARY CARE CLINIC | Facility: CLINIC | Age: 56
End: 2021-04-11
Payer: COMMERCIAL

## 2021-04-11 DIAGNOSIS — Z23 NEED FOR VACCINATION: Primary | ICD-10-CM

## 2021-04-11 PROCEDURE — 91300 PR SARS-COV- 2 COVID-19 VACCINE, NO PRSV, 30MCG/0.3ML, IM: CPT | Mod: S$GLB,,, | Performed by: INTERNAL MEDICINE

## 2021-04-11 PROCEDURE — 0002A PR IMMUNIZ ADMIN, SARS-COV-2 COVID-19 VACC, 30MCG/0.3ML, 2ND DOSE: ICD-10-PCS | Mod: CV19,S$GLB,, | Performed by: INTERNAL MEDICINE

## 2021-04-11 PROCEDURE — 0002A PR IMMUNIZ ADMIN, SARS-COV-2 COVID-19 VACC, 30MCG/0.3ML, 2ND DOSE: CPT | Mod: CV19,S$GLB,, | Performed by: INTERNAL MEDICINE

## 2021-04-11 PROCEDURE — 91300 PR SARS-COV- 2 COVID-19 VACCINE, NO PRSV, 30MCG/0.3ML, IM: ICD-10-PCS | Mod: S$GLB,,, | Performed by: INTERNAL MEDICINE

## 2021-04-11 RX ADMIN — Medication 0.3 ML: at 08:04

## 2021-04-14 ENCOUNTER — TELEPHONE (OUTPATIENT)
Dept: FAMILY MEDICINE | Facility: CLINIC | Age: 56
End: 2021-04-14

## 2021-04-16 ENCOUNTER — PATIENT OUTREACH (OUTPATIENT)
Dept: ADMINISTRATIVE | Facility: HOSPITAL | Age: 56
End: 2021-04-16

## 2021-04-21 DIAGNOSIS — Z12.11 COLON CANCER SCREENING: ICD-10-CM

## 2021-04-29 ENCOUNTER — PATIENT OUTREACH (OUTPATIENT)
Dept: ADMINISTRATIVE | Facility: HOSPITAL | Age: 56
End: 2021-04-29

## 2021-06-07 ENCOUNTER — PATIENT OUTREACH (OUTPATIENT)
Dept: ADMINISTRATIVE | Facility: HOSPITAL | Age: 56
End: 2021-06-07

## 2021-06-21 ENCOUNTER — PATIENT OUTREACH (OUTPATIENT)
Dept: ADMINISTRATIVE | Facility: HOSPITAL | Age: 56
End: 2021-06-21

## 2021-06-24 ENCOUNTER — PATIENT OUTREACH (OUTPATIENT)
Dept: ADMINISTRATIVE | Facility: HOSPITAL | Age: 56
End: 2021-06-24

## 2021-07-05 PROBLEM — Z00.00 VISIT FOR PREVENTIVE HEALTH EXAMINATION: Status: RESOLVED | Noted: 2021-03-31 | Resolved: 2021-07-05

## 2021-09-23 ENCOUNTER — OFFICE VISIT (OUTPATIENT)
Dept: HEMATOLOGY/ONCOLOGY | Facility: CLINIC | Age: 56
End: 2021-09-23
Payer: COMMERCIAL

## 2021-09-23 VITALS
SYSTOLIC BLOOD PRESSURE: 100 MMHG | BODY MASS INDEX: 34.16 KG/M2 | OXYGEN SATURATION: 98 % | HEART RATE: 88 BPM | WEIGHT: 185.63 LBS | HEIGHT: 62 IN | DIASTOLIC BLOOD PRESSURE: 67 MMHG

## 2021-09-23 DIAGNOSIS — Z17.0 MALIGNANT NEOPLASM OF CENTRAL PORTION OF LEFT BREAST IN FEMALE, ESTROGEN RECEPTOR POSITIVE: Primary | ICD-10-CM

## 2021-09-23 DIAGNOSIS — T45.1X5A PERIPHERAL NEUROPATHY DUE TO CHEMOTHERAPY: ICD-10-CM

## 2021-09-23 DIAGNOSIS — G62.0 PERIPHERAL NEUROPATHY DUE TO CHEMOTHERAPY: ICD-10-CM

## 2021-09-23 DIAGNOSIS — D72.810 LYMPHOPENIA: ICD-10-CM

## 2021-09-23 DIAGNOSIS — C50.112 MALIGNANT NEOPLASM OF CENTRAL PORTION OF LEFT BREAST IN FEMALE, ESTROGEN RECEPTOR POSITIVE: Primary | ICD-10-CM

## 2021-09-23 PROCEDURE — 3078F PR MOST RECENT DIASTOLIC BLOOD PRESSURE < 80 MM HG: ICD-10-PCS | Mod: CPTII,S$GLB,, | Performed by: INTERNAL MEDICINE

## 2021-09-23 PROCEDURE — 1159F PR MEDICATION LIST DOCUMENTED IN MEDICAL RECORD: ICD-10-PCS | Mod: CPTII,S$GLB,, | Performed by: INTERNAL MEDICINE

## 2021-09-23 PROCEDURE — 1160F PR REVIEW ALL MEDS BY PRESCRIBER/CLIN PHARMACIST DOCUMENTED: ICD-10-PCS | Mod: CPTII,S$GLB,, | Performed by: INTERNAL MEDICINE

## 2021-09-23 PROCEDURE — 99214 OFFICE O/P EST MOD 30 MIN: CPT | Mod: S$GLB,,, | Performed by: INTERNAL MEDICINE

## 2021-09-23 PROCEDURE — 99214 PR OFFICE/OUTPT VISIT, EST, LEVL IV, 30-39 MIN: ICD-10-PCS | Mod: S$GLB,,, | Performed by: INTERNAL MEDICINE

## 2021-09-23 PROCEDURE — 3074F SYST BP LT 130 MM HG: CPT | Mod: CPTII,S$GLB,, | Performed by: INTERNAL MEDICINE

## 2021-09-23 PROCEDURE — 3078F DIAST BP <80 MM HG: CPT | Mod: CPTII,S$GLB,, | Performed by: INTERNAL MEDICINE

## 2021-09-23 PROCEDURE — 3008F BODY MASS INDEX DOCD: CPT | Mod: CPTII,S$GLB,, | Performed by: INTERNAL MEDICINE

## 2021-09-23 PROCEDURE — 99999 PR PBB SHADOW E&M-EST. PATIENT-LVL III: CPT | Mod: PBBFAC,,, | Performed by: INTERNAL MEDICINE

## 2021-09-23 PROCEDURE — 3074F PR MOST RECENT SYSTOLIC BLOOD PRESSURE < 130 MM HG: ICD-10-PCS | Mod: CPTII,S$GLB,, | Performed by: INTERNAL MEDICINE

## 2021-09-23 PROCEDURE — 99999 PR PBB SHADOW E&M-EST. PATIENT-LVL III: ICD-10-PCS | Mod: PBBFAC,,, | Performed by: INTERNAL MEDICINE

## 2021-09-23 PROCEDURE — 3008F PR BODY MASS INDEX (BMI) DOCUMENTED: ICD-10-PCS | Mod: CPTII,S$GLB,, | Performed by: INTERNAL MEDICINE

## 2021-09-23 PROCEDURE — 1160F RVW MEDS BY RX/DR IN RCRD: CPT | Mod: CPTII,S$GLB,, | Performed by: INTERNAL MEDICINE

## 2021-09-23 PROCEDURE — 1159F MED LIST DOCD IN RCRD: CPT | Mod: CPTII,S$GLB,, | Performed by: INTERNAL MEDICINE

## 2022-03-23 DIAGNOSIS — C50.112 MALIGNANT NEOPLASM OF CENTRAL PORTION OF LEFT BREAST IN FEMALE, ESTROGEN RECEPTOR POSITIVE: Primary | ICD-10-CM

## 2022-03-23 DIAGNOSIS — Z17.0 MALIGNANT NEOPLASM OF CENTRAL PORTION OF LEFT BREAST IN FEMALE, ESTROGEN RECEPTOR POSITIVE: Primary | ICD-10-CM

## 2022-03-30 ENCOUNTER — LAB VISIT (OUTPATIENT)
Dept: LAB | Facility: HOSPITAL | Age: 57
End: 2022-03-30
Attending: INTERNAL MEDICINE
Payer: COMMERCIAL

## 2022-03-30 ENCOUNTER — OFFICE VISIT (OUTPATIENT)
Dept: HEMATOLOGY/ONCOLOGY | Facility: CLINIC | Age: 57
End: 2022-03-30
Payer: COMMERCIAL

## 2022-03-30 VITALS
BODY MASS INDEX: 33.07 KG/M2 | OXYGEN SATURATION: 98 % | TEMPERATURE: 98 F | HEIGHT: 62 IN | DIASTOLIC BLOOD PRESSURE: 83 MMHG | HEART RATE: 85 BPM | SYSTOLIC BLOOD PRESSURE: 126 MMHG | WEIGHT: 179.69 LBS | RESPIRATION RATE: 16 BRPM

## 2022-03-30 DIAGNOSIS — Z17.1 MALIGNANT NEOPLASM OF CENTRAL PORTION OF LEFT BREAST IN FEMALE, ESTROGEN RECEPTOR NEGATIVE: Primary | ICD-10-CM

## 2022-03-30 DIAGNOSIS — T45.1X5A PERIPHERAL NEUROPATHY DUE TO CHEMOTHERAPY: ICD-10-CM

## 2022-03-30 DIAGNOSIS — C50.112 MALIGNANT NEOPLASM OF CENTRAL PORTION OF LEFT BREAST IN FEMALE, ESTROGEN RECEPTOR POSITIVE: ICD-10-CM

## 2022-03-30 DIAGNOSIS — C50.112 MALIGNANT NEOPLASM OF CENTRAL PORTION OF LEFT BREAST IN FEMALE, ESTROGEN RECEPTOR NEGATIVE: Primary | ICD-10-CM

## 2022-03-30 DIAGNOSIS — G62.0 PERIPHERAL NEUROPATHY DUE TO CHEMOTHERAPY: ICD-10-CM

## 2022-03-30 DIAGNOSIS — Z17.0 MALIGNANT NEOPLASM OF CENTRAL PORTION OF LEFT BREAST IN FEMALE, ESTROGEN RECEPTOR POSITIVE: ICD-10-CM

## 2022-03-30 DIAGNOSIS — D72.810 LYMPHOPENIA: ICD-10-CM

## 2022-03-30 LAB
ALBUMIN SERPL BCP-MCNC: 3.8 G/DL (ref 3.5–5.2)
ALP SERPL-CCNC: 62 U/L (ref 55–135)
ALT SERPL W/O P-5'-P-CCNC: 15 U/L (ref 10–44)
ANION GAP SERPL CALC-SCNC: 9 MMOL/L (ref 8–16)
AST SERPL-CCNC: 17 U/L (ref 10–40)
BASOPHILS # BLD AUTO: 0.02 K/UL (ref 0–0.2)
BASOPHILS NFR BLD: 0.6 % (ref 0–1.9)
BILIRUB SERPL-MCNC: 0.6 MG/DL (ref 0.1–1)
BUN SERPL-MCNC: 9 MG/DL (ref 6–20)
CALCIUM SERPL-MCNC: 9.7 MG/DL (ref 8.7–10.5)
CHLORIDE SERPL-SCNC: 105 MMOL/L (ref 95–110)
CO2 SERPL-SCNC: 28 MMOL/L (ref 23–29)
CREAT SERPL-MCNC: 0.8 MG/DL (ref 0.5–1.4)
DIFFERENTIAL METHOD: ABNORMAL
EOSINOPHIL # BLD AUTO: 0 K/UL (ref 0–0.5)
EOSINOPHIL NFR BLD: 0.9 % (ref 0–8)
ERYTHROCYTE [DISTWIDTH] IN BLOOD BY AUTOMATED COUNT: 14 % (ref 11.5–14.5)
EST. GFR  (AFRICAN AMERICAN): >60 ML/MIN/1.73 M^2
EST. GFR  (NON AFRICAN AMERICAN): >60 ML/MIN/1.73 M^2
GLUCOSE SERPL-MCNC: 87 MG/DL (ref 70–110)
HCT VFR BLD AUTO: 37.4 % (ref 37–48.5)
HGB BLD-MCNC: 12.3 G/DL (ref 12–16)
IMM GRANULOCYTES # BLD AUTO: 0.01 K/UL (ref 0–0.04)
IMM GRANULOCYTES NFR BLD AUTO: 0.3 % (ref 0–0.5)
LYMPHOCYTES # BLD AUTO: 0.9 K/UL (ref 1–4.8)
LYMPHOCYTES NFR BLD: 26.4 % (ref 18–48)
MCH RBC QN AUTO: 27 PG (ref 27–31)
MCHC RBC AUTO-ENTMCNC: 32.9 G/DL (ref 32–36)
MCV RBC AUTO: 82 FL (ref 82–98)
MONOCYTES # BLD AUTO: 0.3 K/UL (ref 0.3–1)
MONOCYTES NFR BLD: 8.5 % (ref 4–15)
NEUTROPHILS # BLD AUTO: 2.1 K/UL (ref 1.8–7.7)
NEUTROPHILS NFR BLD: 63.3 % (ref 38–73)
NRBC BLD-RTO: 0 /100 WBC
PLATELET # BLD AUTO: 165 K/UL (ref 150–450)
PMV BLD AUTO: 9.7 FL (ref 9.2–12.9)
POTASSIUM SERPL-SCNC: 3.5 MMOL/L (ref 3.5–5.1)
PROT SERPL-MCNC: 7.1 G/DL (ref 6–8.4)
RBC # BLD AUTO: 4.56 M/UL (ref 4–5.4)
SODIUM SERPL-SCNC: 142 MMOL/L (ref 136–145)
WBC # BLD AUTO: 3.3 K/UL (ref 3.9–12.7)

## 2022-03-30 PROCEDURE — 3008F BODY MASS INDEX DOCD: CPT | Mod: CPTII,S$GLB,, | Performed by: INTERNAL MEDICINE

## 2022-03-30 PROCEDURE — 36415 COLL VENOUS BLD VENIPUNCTURE: CPT | Performed by: INTERNAL MEDICINE

## 2022-03-30 PROCEDURE — 99215 PR OFFICE/OUTPT VISIT, EST, LEVL V, 40-54 MIN: ICD-10-PCS | Mod: S$GLB,,, | Performed by: INTERNAL MEDICINE

## 2022-03-30 PROCEDURE — 1160F PR REVIEW ALL MEDS BY PRESCRIBER/CLIN PHARMACIST DOCUMENTED: ICD-10-PCS | Mod: CPTII,S$GLB,, | Performed by: INTERNAL MEDICINE

## 2022-03-30 PROCEDURE — 99999 PR PBB SHADOW E&M-EST. PATIENT-LVL III: ICD-10-PCS | Mod: PBBFAC,,, | Performed by: INTERNAL MEDICINE

## 2022-03-30 PROCEDURE — 3079F DIAST BP 80-89 MM HG: CPT | Mod: CPTII,S$GLB,, | Performed by: INTERNAL MEDICINE

## 2022-03-30 PROCEDURE — 1159F PR MEDICATION LIST DOCUMENTED IN MEDICAL RECORD: ICD-10-PCS | Mod: CPTII,S$GLB,, | Performed by: INTERNAL MEDICINE

## 2022-03-30 PROCEDURE — 3079F PR MOST RECENT DIASTOLIC BLOOD PRESSURE 80-89 MM HG: ICD-10-PCS | Mod: CPTII,S$GLB,, | Performed by: INTERNAL MEDICINE

## 2022-03-30 PROCEDURE — 3074F PR MOST RECENT SYSTOLIC BLOOD PRESSURE < 130 MM HG: ICD-10-PCS | Mod: CPTII,S$GLB,, | Performed by: INTERNAL MEDICINE

## 2022-03-30 PROCEDURE — 1159F MED LIST DOCD IN RCRD: CPT | Mod: CPTII,S$GLB,, | Performed by: INTERNAL MEDICINE

## 2022-03-30 PROCEDURE — 80053 COMPREHEN METABOLIC PANEL: CPT | Performed by: INTERNAL MEDICINE

## 2022-03-30 PROCEDURE — 85025 COMPLETE CBC W/AUTO DIFF WBC: CPT | Performed by: INTERNAL MEDICINE

## 2022-03-30 PROCEDURE — 1160F RVW MEDS BY RX/DR IN RCRD: CPT | Mod: CPTII,S$GLB,, | Performed by: INTERNAL MEDICINE

## 2022-03-30 PROCEDURE — 99999 PR PBB SHADOW E&M-EST. PATIENT-LVL III: CPT | Mod: PBBFAC,,, | Performed by: INTERNAL MEDICINE

## 2022-03-30 PROCEDURE — 3008F PR BODY MASS INDEX (BMI) DOCUMENTED: ICD-10-PCS | Mod: CPTII,S$GLB,, | Performed by: INTERNAL MEDICINE

## 2022-03-30 PROCEDURE — 3074F SYST BP LT 130 MM HG: CPT | Mod: CPTII,S$GLB,, | Performed by: INTERNAL MEDICINE

## 2022-03-30 PROCEDURE — 99215 OFFICE O/P EST HI 40 MIN: CPT | Mod: S$GLB,,, | Performed by: INTERNAL MEDICINE

## 2022-03-30 RX ORDER — MELOXICAM 15 MG/1
15 TABLET ORAL DAILY
COMMUNITY
Start: 2022-03-25

## 2022-03-30 NOTE — PROGRESS NOTES
PROGRESS NOTE    Subjective:       Patient ID: Saadia Cancino is a 56 y.o. female.  MRN: [id number]  : 1965    Chief Complaint: Breast Cancer     History of Present Illness:   Saadia Cancino is a 56 y.o. female who presents with left breast, invasive ductal carcinoma. Prior [facility] patient.    See oncology history below.    She underwent left shoulder surgery for rotator cuff repair in 2021, is still undergoing physical therapy.   She reports achiness and heaviness of the left breast over the last couple of weeks.   Neuropathy is stable, tried to stop gabapentin but had to resume it and it helps, same dose of 300 mg po qhs.        She reports a stable weight, appetite and performance status.  She is up-to-date with preventive care.        Oncology History:    Briefly, diagnosed with left breast invasive ductal carcinoma in 2018. She underwent lumpectomy and sentinel node exam followed by adjuvant chemotherapy and radiation therapy.  She has been RANDALL since then.  Grade 3 invasive ductal carcinoma, ER negative, TN 3%, HER2/ian not amplified, pT2 N1mi anatomic Stage IIB, clinical stage IIIA     Oncology History   Malignant neoplasm of central portion of left breast in female, estrogen receptor negative   2018 Cancer Staged    Staging form: Breast, AJCC 8th Edition  - Clinical stage from 2018: Stage IIIA (cT2, cN1mi(sn), cM0, G3, ER-, TN+, HER2-)     3/31/2021 Initial Diagnosis    Malignant neoplasm of central portion of left breast in female, estrogen receptor positive         History:  History reviewed. No pertinent past medical history.   Past Surgical History:   Procedure Laterality Date    cyst removed Right      History reviewed. No pertinent family history.  Social History     Tobacco Use    Smoking status: Never Smoker    Smokeless tobacco: Never Used   Substance and Sexual Activity    Alcohol use: Yes     Comment:  "ocassional    Drug use: No    Sexual activity: Not on file        ROS:   Review of Systems   Constitutional: Negative for fever, malaise/fatigue and weight loss.   HENT: Negative for congestion, hearing loss, nosebleeds and sore throat.    Eyes: Negative for double vision and photophobia.   Respiratory: Negative for cough, hemoptysis, sputum production, shortness of breath and wheezing.    Cardiovascular: Negative for chest pain, palpitations, orthopnea and leg swelling.   Gastrointestinal: Negative for abdominal pain, blood in stool, constipation, diarrhea, heartburn, nausea and vomiting.   Genitourinary: Negative for dysuria, hematuria and urgency.   Musculoskeletal: Positive for joint pain (left shoulder, post op ). Negative for back pain and myalgias.   Skin: Negative for itching and rash.   Neurological: Positive for tingling and speech change. Negative for dizziness, seizures, weakness and headaches.   Endo/Heme/Allergies: Negative for polydipsia. Does not bruise/bleed easily.   Psychiatric/Behavioral: Negative for depression and memory loss. The patient is not nervous/anxious and does not have insomnia.         Objective:     Vitals:    03/30/22 1022   BP: 126/83   Pulse: 85   Resp: 16   Temp: 98 °F (36.7 °C)   TempSrc: Oral   SpO2: 98%   Weight: 81.5 kg (179 lb 10.8 oz)   Height: 5' 2" (1.575 m)   PainSc:   3   PainLoc: Breast     Wt Readings from Last 10 Encounters:   03/30/22 81.5 kg (179 lb 10.8 oz)   09/23/21 84.2 kg (185 lb 10 oz)   04/09/21 84.4 kg (186 lb 1.6 oz)   03/31/21 85.6 kg (188 lb 11.4 oz)   12/05/17 77.1 kg (170 lb)   12/01/17 77.1 kg (170 lb)   11/27/17 77.1 kg (170 lb)       Physical Examination:   Physical Exam  Vitals and nursing note reviewed.   Constitutional:       General: She is not in acute distress.     Appearance: She is not diaphoretic.   HENT:      Head: Normocephalic.      Mouth/Throat:      Pharynx: No oropharyngeal exudate.   Eyes:      General: No scleral icterus.     " Conjunctiva/sclera: Conjunctivae normal.   Neck:      Thyroid: No thyromegaly.   Cardiovascular:      Rate and Rhythm: Normal rate and regular rhythm.      Heart sounds: Normal heart sounds. No murmur heard.  Pulmonary:      Effort: Pulmonary effort is normal. No respiratory distress.      Breath sounds: No stridor. No wheezing or rales.   Chest:      Chest wall: No tenderness.   Abdominal:      General: Bowel sounds are normal. There is no distension.      Palpations: Abdomen is soft. There is no mass.      Tenderness: There is no abdominal tenderness. There is no rebound.   Musculoskeletal:         General: No tenderness or deformity. Normal range of motion.      Cervical back: Neck supple.   Lymphadenopathy:      Cervical: No cervical adenopathy.   Skin:     General: Skin is warm and dry.      Findings: No erythema or rash.      Comments: No breast mass, skin or nipple changes or axillary adenopathy bilaterally. Stable left breast post op changes.    Neurological:      Mental Status: She is alert and oriented to person, place, and time.      Cranial Nerves: No cranial nerve deficit.      Coordination: Coordination normal.      Gait: Gait is intact.   Psychiatric:         Mood and Affect: Affect normal.         Cognition and Memory: Memory normal.         Judgment: Judgment normal.          Diagnostic Tests:  Significant Imaging: I have reviewed and interpreted all pertinent imaging results/findings.    Laboratory Data:  All pertinent labs have been reviewed.  Labs:   Lab Results   Component Value Date    WBC 3.30 (L) 03/30/2022    RBC 4.56 03/30/2022    HGB 12.3 03/30/2022    HCT 37.4 03/30/2022    MCV 82 03/30/2022     03/30/2022    GLU 87 03/30/2022     03/30/2022    K 3.5 03/30/2022    BUN 9 03/30/2022    CREATININE 0.8 03/30/2022    AST 17 03/30/2022    ALT 15 03/30/2022    BILITOT 0.6 03/30/2022       Assessment/Plan:   Malignant neoplasm of central portion of left breast in female, estrogen  receptor negative  Grade 3, ER negative, VA 3% positive, HER2/ian not amplified,[id number], clinical prognostic stage IIIA     Diagnosed in April 2018, status post lumpectomy, adjuvant chemotherapy with dose dense AC +paclitaxel x8 of 12 due to peripheral neuropathy.  She underwent adjuvant radiation therapy.   Clinically, she was triple negative with very low VA positivity and endocrine therapy was deferred and that was her preference as well.    Continue active surveillance. She is due for a follow up mammogram in April 2022, to be ordered at  by her gynecologist, will review.  She is up-to-date with her colonoscopy, Pap smear.   I will see her back in six months as long as she is stable.      Lymphopenia  Mild and stable to improving. Continue to monitor.     Peripheral neuropathy due to chemotherapy  Continue gabapentin. We discussed integrative oncology and accupuncture at this time and she wants to hold off and may consider it after completing shoulder PT.     ECOG SCORE    0 - Fully active-able to carry on all pre-disease performance without restriction           Discussion:   No follow-ups on file.    Plan was discussed with the patient at length, and she verbalized understanding. Saadia was given an opportunity to ask questions that were answered to her satisfaction, and she was advised to call in the interval if any problems or questions arise.    Electronically signed by Lisha Damico MD    Route Chart for Scheduling    Med Onc Chart Routing      Follow up with physician 6 months. Same day labs and md visit    Follow up with MARILY    Labs CBC and CMP   Lab interval:     Imaging    Pharmacy appointment    Other referrals

## 2022-08-03 ENCOUNTER — TELEPHONE (OUTPATIENT)
Dept: HEMATOLOGY/ONCOLOGY | Facility: CLINIC | Age: 57
End: 2022-08-03
Payer: COMMERCIAL

## 2022-08-03 DIAGNOSIS — C50.112 MALIGNANT NEOPLASM OF CENTRAL PORTION OF LEFT BREAST IN FEMALE, ESTROGEN RECEPTOR NEGATIVE: Primary | ICD-10-CM

## 2022-08-03 DIAGNOSIS — Z17.1 MALIGNANT NEOPLASM OF CENTRAL PORTION OF LEFT BREAST IN FEMALE, ESTROGEN RECEPTOR NEGATIVE: Primary | ICD-10-CM

## 2022-08-03 NOTE — TELEPHONE ENCOUNTER
"Returned pt call. Pt states that she thinks her "blood sugar is off" agrees to follow up with PCP.  6 mo follow up with Dr. Damico scheduled while on phone with pt.   Pt agrees to plan and agrees to call office if unable to see PCP. Pt denies distress or need for EMS at this time.  appt reminder printed and mailed to pt.               ----- Message from Renay Mead sent at 8/3/2022 11:52 AM CDT -----  Type:  Sooner Apoointment Request    Caller is requesting a sooner appointment.  Caller declined first available appointment listed below.  Caller will not accept being placed on the waitlist and is requesting a message be sent to doctor.  Name of Caller:pt   When is the first available appointment?none   Would the patient rather a call back or a response via [facility]? Call back   Best Call Back Number:[phone]  Additional Information: follow up          "

## 2022-08-10 ENCOUNTER — OFFICE VISIT (OUTPATIENT)
Dept: FAMILY MEDICINE | Facility: CLINIC | Age: 57
End: 2022-08-10
Payer: COMMERCIAL

## 2022-08-10 VITALS
WEIGHT: 177.88 LBS | HEART RATE: 83 BPM | SYSTOLIC BLOOD PRESSURE: 108 MMHG | OXYGEN SATURATION: 99 % | HEIGHT: 62 IN | DIASTOLIC BLOOD PRESSURE: 70 MMHG | BODY MASS INDEX: 32.73 KG/M2

## 2022-08-10 DIAGNOSIS — G62.0 PERIPHERAL NEUROPATHY DUE TO CHEMOTHERAPY: ICD-10-CM

## 2022-08-10 DIAGNOSIS — T45.1X5A PERIPHERAL NEUROPATHY DUE TO CHEMOTHERAPY: ICD-10-CM

## 2022-08-10 DIAGNOSIS — E66.09 CLASS 1 OBESITY DUE TO EXCESS CALORIES WITHOUT SERIOUS COMORBIDITY WITH BODY MASS INDEX (BMI) OF 32.0 TO 32.9 IN ADULT: ICD-10-CM

## 2022-08-10 DIAGNOSIS — Z00.00 ANNUAL PHYSICAL EXAM: Primary | ICD-10-CM

## 2022-08-10 PROBLEM — N95.1 MENOPAUSAL STATE: Status: ACTIVE | Noted: 2022-05-02

## 2022-08-10 PROBLEM — E66.811 CLASS 1 OBESITY DUE TO EXCESS CALORIES WITHOUT SERIOUS COMORBIDITY WITH BODY MASS INDEX (BMI) OF 32.0 TO 32.9 IN ADULT: Status: ACTIVE | Noted: 2022-08-10

## 2022-08-10 PROCEDURE — 3078F PR MOST RECENT DIASTOLIC BLOOD PRESSURE < 80 MM HG: ICD-10-PCS | Mod: CPTII,S$GLB,,

## 2022-08-10 PROCEDURE — 1160F PR REVIEW ALL MEDS BY PRESCRIBER/CLIN PHARMACIST DOCUMENTED: ICD-10-PCS | Mod: CPTII,S$GLB,,

## 2022-08-10 PROCEDURE — 1159F PR MEDICATION LIST DOCUMENTED IN MEDICAL RECORD: ICD-10-PCS | Mod: CPTII,S$GLB,,

## 2022-08-10 PROCEDURE — 99999 PR PBB SHADOW E&M-EST. PATIENT-LVL IV: CPT | Mod: PBBFAC,,,

## 2022-08-10 PROCEDURE — 1159F MED LIST DOCD IN RCRD: CPT | Mod: CPTII,S$GLB,,

## 2022-08-10 PROCEDURE — 99999 PR PBB SHADOW E&M-EST. PATIENT-LVL IV: ICD-10-PCS | Mod: PBBFAC,,,

## 2022-08-10 PROCEDURE — 3078F DIAST BP <80 MM HG: CPT | Mod: CPTII,S$GLB,,

## 2022-08-10 PROCEDURE — 3074F SYST BP LT 130 MM HG: CPT | Mod: CPTII,S$GLB,,

## 2022-08-10 PROCEDURE — 1160F RVW MEDS BY RX/DR IN RCRD: CPT | Mod: CPTII,S$GLB,,

## 2022-08-10 PROCEDURE — 3074F PR MOST RECENT SYSTOLIC BLOOD PRESSURE < 130 MM HG: ICD-10-PCS | Mod: CPTII,S$GLB,,

## 2022-08-10 PROCEDURE — 3008F PR BODY MASS INDEX (BMI) DOCUMENTED: ICD-10-PCS | Mod: CPTII,S$GLB,,

## 2022-08-10 PROCEDURE — 99396 PR PREVENTIVE VISIT,EST,40-64: ICD-10-PCS | Mod: S$GLB,,,

## 2022-08-10 PROCEDURE — 3008F BODY MASS INDEX DOCD: CPT | Mod: CPTII,S$GLB,,

## 2022-08-10 PROCEDURE — 99396 PREV VISIT EST AGE 40-64: CPT | Mod: S$GLB,,,

## 2022-08-10 RX ORDER — GABAPENTIN 300 MG/1
300 CAPSULE ORAL 3 TIMES DAILY
Qty: 90 CAPSULE | Refills: 0 | Status: SHIPPED | OUTPATIENT
Start: 2022-08-10 | End: 2023-09-29 | Stop reason: SDUPTHER

## 2022-08-10 NOTE — PATIENT INSTRUCTIONS
Continue to walk, try to increase to daily as tolerated  Drink at least 4-16 oz bottles of water per day or more    Will notify of results via my chart

## 2022-08-10 NOTE — PROGRESS NOTES
Subjective:       Patient ID: Saadia Cancino is a 56 y.o. female.    Chief Complaint: No chief complaint on file.    HPI  Saadia Cancino is a 56 y.o female patient of Dr. Mcpherson with history left breast cancer, peripheral neuropathy, unknown to me, presents today with her spouse for annual exam with labs.     Dx 2018 with left breast cancer stage IIIA, reports chemo and radiation with mass removal. Currently followed by Dr. Damico, next appt is Sept 2022, sees her every 6 months. Also reports peripheral neuropathy due to the treatment. Was taking Gabapentin, is having some tingling today, would like a refill.     Reports feeling fatigued lately x 2 weeks. Has cut out sugar, was drinking lemonade too much. Slept all weekend long. Took covid test yesterday which was negative. Denies upper respiratory symptoms. Feels it may be due to cutting out sugars.    Reports eating a cookie this morning, not currently fasting. Labs discussed, instructed to have labs done on an empty stomach.     Post menopausal.     Reports has been walking every other day for about 30 minutes; has also cut out sugar. Working to lose a few lbs.     No past medical history on file.    Past Surgical History:   Procedure Laterality Date    cyst removed Right        No family history on file.    Social History     Socioeconomic History    Marital status:    Tobacco Use    Smoking status: Never Smoker    Smokeless tobacco: Never Used   Substance and Sexual Activity    Alcohol use: Yes     Comment: ocassional    Drug use: No       Review of Systems   Constitutional: Positive for fatigue. Negative for appetite change.   Respiratory: Negative for cough and shortness of breath.    Cardiovascular: Negative for chest pain.   Gastrointestinal: Negative for abdominal pain, constipation, diarrhea, nausea and vomiting.   Endocrine: Negative for polydipsia, polyphagia and polyuria.   Genitourinary: Negative for decreased urine volume, dysuria,  "frequency, pelvic pain and urgency.   Musculoskeletal: Negative for arthralgias and myalgias.   Neurological: Positive for numbness. Negative for dizziness and light-headedness.   Psychiatric/Behavioral: Negative.          Objective:     Vitals:    08/10/22 0849   BP: 108/70   Pulse: 83   SpO2: 99%   Weight: 80.7 kg (177 lb 14.4 oz)   Height: 5' 2" (1.575 m)          Physical Exam  Constitutional:       Appearance: Normal appearance. She is well-developed and well-groomed.   HENT:      Head: Normocephalic and atraumatic.   Eyes:      Pupils: Pupils are equal, round, and reactive to light.   Cardiovascular:      Rate and Rhythm: Normal rate and regular rhythm.      Heart sounds: Normal heart sounds.   Pulmonary:      Effort: Pulmonary effort is normal.      Breath sounds: Normal breath sounds.   Abdominal:      General: Bowel sounds are normal.      Palpations: Abdomen is soft.   Musculoskeletal:         General: Normal range of motion.      Cervical back: Normal range of motion.      Right lower leg: No edema.      Left lower leg: No edema.   Skin:     General: Skin is warm and dry.      Capillary Refill: Capillary refill takes less than 2 seconds.   Neurological:      General: No focal deficit present.      Mental Status: She is alert and oriented to person, place, and time.   Psychiatric:         Attention and Perception: Attention normal.         Mood and Affect: Mood normal.         Speech: Speech normal.         Behavior: Behavior is cooperative.           Laboratory:  CBC:  No results for input(s): WBC, RBC, HGB, HCT, PLT, MCV, MCH, MCHC in the last 2160 hours.  CMP:  No results for input(s): GLU, CALCIUM, ALBUMIN, PROT, NA, K, CO2, CL, BUN, ALKPHOS, ALT, AST, BILITOT in the last 2160 hours.    Invalid input(s): CREATININ  URINALYSIS:  No results for input(s): COLORU, CLARITYU, SPECGRAV, PHUR, PROTEINUA, GLUCOSEU, BILIRUBINCON, BLOODU, WBCU, RBCU, BACTERIA, MUCUS, NITRITE, LEUKOCYTESUR, UROBILINOGEN, " HYALINECASTS in the last 2160 hours.   LIPIDS:  Recent Labs   Lab Result Units 08/10/22  0936   TSH uIU/mL 1.370     TSH:  Recent Labs   Lab Result Units 08/10/22  0936   TSH uIU/mL 1.370     A1C:  No results for input(s): HGBA1C in the last 2160 hours.    Assessment:         ICD-10-CM ICD-9-CM   1. Annual physical exam  Z00.00 V70.0   2. Class 1 obesity due to excess calories without serious comorbidity with body mass index (BMI) of 32.0 to 32.9 in adult  E66.09 278.00    Z68.32 V85.32   3. Peripheral neuropathy due to chemotherapy  G62.0 357.7    T45.1X5A E933.1       Plan:       Annual physical exam  -     Hemoglobin A1C; Future; Expected date: 08/10/2022  -     Lipid Panel; Future; Expected date: 08/10/2022  -     TSH; Future; Expected date: 08/10/2022  Instructed patient to have fasting labs, also instructed to have CMP and CBC ordered by Dr. Damico done at the same time.   Will notify via my chart of lab results.     Class 1 obesity due to excess calories without serious comorbidity with body mass index (BMI) of 32.0 to 32.9 in adult  Continue to work on diet modification and increase in physical activity; work on walking daily vs every other day.   Drink at least 4-16 oz bottles of water per day or more    Peripheral neuropathy due to chemotherapy  -     gabapentin (NEURONTIN) 300 MG capsule; Take 1 capsule (300 mg total) by mouth 3 (three) times daily.  Dispense: 90 capsule; Refill: 0    Keep follow up appointment with specialist.   Return to the clinic in 1 year or as needed.     Patient and spouse verbalizes understanding and agrees with current treatment plan.      Follow up in about 1 year (around 8/10/2023).     Patient's Medications   New Prescriptions    No medications on file   Previous Medications    MELOXICAM (MOBIC) 15 MG TABLET    Take 15 mg by mouth once daily.    NAPROXEN (NAPROSYN) 500 MG TABLET    Take 1 tablet (500 mg total) by mouth 2 (two) times daily as needed (joint/back pain).     TIZANIDINE (ZANAFLEX) 4 MG TABLET    Take 4 mg by mouth nightly as needed.   Modified Medications    Modified Medication Previous Medication    GABAPENTIN (NEURONTIN) 300 MG CAPSULE gabapentin (NEURONTIN) 300 MG capsule       Take 1 capsule (300 mg total) by mouth 3 (three) times daily.    Take 300 mg by mouth once daily.   Discontinued Medications    No medications on file       Kim Ramirez NP

## 2022-08-12 ENCOUNTER — PATIENT MESSAGE (OUTPATIENT)
Dept: HEMATOLOGY/ONCOLOGY | Facility: CLINIC | Age: 57
End: 2022-08-12
Payer: COMMERCIAL

## 2022-08-16 ENCOUNTER — TELEPHONE (OUTPATIENT)
Dept: HEMATOLOGY/ONCOLOGY | Facility: CLINIC | Age: 57
End: 2022-08-16
Payer: COMMERCIAL

## 2022-08-26 ENCOUNTER — LAB VISIT (OUTPATIENT)
Dept: LAB | Facility: HOSPITAL | Age: 57
End: 2022-08-26
Attending: INTERNAL MEDICINE
Payer: COMMERCIAL

## 2022-08-26 ENCOUNTER — TELEPHONE (OUTPATIENT)
Dept: HEMATOLOGY/ONCOLOGY | Facility: CLINIC | Age: 57
End: 2022-08-26
Payer: COMMERCIAL

## 2022-08-26 ENCOUNTER — OFFICE VISIT (OUTPATIENT)
Dept: HEMATOLOGY/ONCOLOGY | Facility: CLINIC | Age: 57
End: 2022-08-26
Payer: COMMERCIAL

## 2022-08-26 VITALS
TEMPERATURE: 98 F | RESPIRATION RATE: 18 BRPM | BODY MASS INDEX: 32.64 KG/M2 | WEIGHT: 177.38 LBS | OXYGEN SATURATION: 99 % | HEIGHT: 62 IN | SYSTOLIC BLOOD PRESSURE: 123 MMHG | DIASTOLIC BLOOD PRESSURE: 75 MMHG | HEART RATE: 78 BPM

## 2022-08-26 DIAGNOSIS — R73.03 PRE-DIABETES: ICD-10-CM

## 2022-08-26 DIAGNOSIS — C50.112 MALIGNANT NEOPLASM OF CENTRAL PORTION OF LEFT BREAST IN FEMALE, ESTROGEN RECEPTOR POSITIVE: ICD-10-CM

## 2022-08-26 DIAGNOSIS — D70.9 NEUTROPENIA, UNSPECIFIED TYPE: ICD-10-CM

## 2022-08-26 DIAGNOSIS — Z17.0 MALIGNANT NEOPLASM OF CENTRAL PORTION OF LEFT BREAST IN FEMALE, ESTROGEN RECEPTOR POSITIVE: ICD-10-CM

## 2022-08-26 DIAGNOSIS — T45.1X5A PERIPHERAL NEUROPATHY DUE TO CHEMOTHERAPY: ICD-10-CM

## 2022-08-26 DIAGNOSIS — Z17.1 MALIGNANT NEOPLASM OF CENTRAL PORTION OF LEFT BREAST IN FEMALE, ESTROGEN RECEPTOR NEGATIVE: ICD-10-CM

## 2022-08-26 DIAGNOSIS — C50.112 MALIGNANT NEOPLASM OF CENTRAL PORTION OF LEFT BREAST IN FEMALE, ESTROGEN RECEPTOR NEGATIVE: Primary | ICD-10-CM

## 2022-08-26 DIAGNOSIS — C50.112 MALIGNANT NEOPLASM OF CENTRAL PORTION OF LEFT BREAST IN FEMALE, ESTROGEN RECEPTOR NEGATIVE: ICD-10-CM

## 2022-08-26 DIAGNOSIS — Z17.1 MALIGNANT NEOPLASM OF CENTRAL PORTION OF LEFT BREAST IN FEMALE, ESTROGEN RECEPTOR NEGATIVE: Primary | ICD-10-CM

## 2022-08-26 DIAGNOSIS — G62.0 PERIPHERAL NEUROPATHY DUE TO CHEMOTHERAPY: ICD-10-CM

## 2022-08-26 LAB
ALBUMIN SERPL BCP-MCNC: 4 G/DL (ref 3.5–5.2)
ALP SERPL-CCNC: 63 U/L (ref 55–135)
ALT SERPL W/O P-5'-P-CCNC: 17 U/L (ref 10–44)
ANION GAP SERPL CALC-SCNC: 7 MMOL/L (ref 8–16)
AST SERPL-CCNC: 19 U/L (ref 10–40)
BASOPHILS # BLD AUTO: 0.02 K/UL (ref 0–0.2)
BASOPHILS NFR BLD: 0.7 % (ref 0–1.9)
BILIRUB SERPL-MCNC: 0.6 MG/DL (ref 0.1–1)
BUN SERPL-MCNC: 11 MG/DL (ref 6–20)
CALCIUM SERPL-MCNC: 9.6 MG/DL (ref 8.7–10.5)
CHLORIDE SERPL-SCNC: 107 MMOL/L (ref 95–110)
CO2 SERPL-SCNC: 28 MMOL/L (ref 23–29)
CREAT SERPL-MCNC: 0.8 MG/DL (ref 0.5–1.4)
DIFFERENTIAL METHOD: ABNORMAL
EOSINOPHIL # BLD AUTO: 0 K/UL (ref 0–0.5)
EOSINOPHIL NFR BLD: 1.1 % (ref 0–8)
ERYTHROCYTE [DISTWIDTH] IN BLOOD BY AUTOMATED COUNT: 13.7 % (ref 11.5–14.5)
EST. GFR  (NO RACE VARIABLE): >60 ML/MIN/1.73 M^2
GLUCOSE SERPL-MCNC: 94 MG/DL (ref 70–110)
HCT VFR BLD AUTO: 37.3 % (ref 37–48.5)
HGB BLD-MCNC: 12.7 G/DL (ref 12–16)
IMM GRANULOCYTES # BLD AUTO: 0.01 K/UL (ref 0–0.04)
IMM GRANULOCYTES NFR BLD AUTO: 0.4 % (ref 0–0.5)
LYMPHOCYTES # BLD AUTO: 1 K/UL (ref 1–4.8)
LYMPHOCYTES NFR BLD: 33.8 % (ref 18–48)
MCH RBC QN AUTO: 27.9 PG (ref 27–31)
MCHC RBC AUTO-ENTMCNC: 34 G/DL (ref 32–36)
MCV RBC AUTO: 82 FL (ref 82–98)
MONOCYTES # BLD AUTO: 0.4 K/UL (ref 0.3–1)
MONOCYTES NFR BLD: 12.5 % (ref 4–15)
NEUTROPHILS # BLD AUTO: 1.5 K/UL (ref 1.8–7.7)
NEUTROPHILS NFR BLD: 51.5 % (ref 38–73)
NRBC BLD-RTO: 0 /100 WBC
PLATELET # BLD AUTO: 167 K/UL (ref 150–450)
PMV BLD AUTO: 9.5 FL (ref 9.2–12.9)
POTASSIUM SERPL-SCNC: 4 MMOL/L (ref 3.5–5.1)
PROT SERPL-MCNC: 7.2 G/DL (ref 6–8.4)
RBC # BLD AUTO: 4.55 M/UL (ref 4–5.4)
SODIUM SERPL-SCNC: 142 MMOL/L (ref 136–145)
WBC # BLD AUTO: 2.81 K/UL (ref 3.9–12.7)

## 2022-08-26 PROCEDURE — 3074F SYST BP LT 130 MM HG: CPT | Mod: CPTII,S$GLB,, | Performed by: INTERNAL MEDICINE

## 2022-08-26 PROCEDURE — 3044F PR MOST RECENT HEMOGLOBIN A1C LEVEL <7.0%: ICD-10-PCS | Mod: CPTII,S$GLB,, | Performed by: INTERNAL MEDICINE

## 2022-08-26 PROCEDURE — 36415 COLL VENOUS BLD VENIPUNCTURE: CPT | Performed by: INTERNAL MEDICINE

## 2022-08-26 PROCEDURE — 80053 COMPREHEN METABOLIC PANEL: CPT | Performed by: INTERNAL MEDICINE

## 2022-08-26 PROCEDURE — 1159F PR MEDICATION LIST DOCUMENTED IN MEDICAL RECORD: ICD-10-PCS | Mod: CPTII,S$GLB,, | Performed by: INTERNAL MEDICINE

## 2022-08-26 PROCEDURE — 99999 PR PBB SHADOW E&M-EST. PATIENT-LVL IV: ICD-10-PCS | Mod: PBBFAC,,, | Performed by: INTERNAL MEDICINE

## 2022-08-26 PROCEDURE — 3078F DIAST BP <80 MM HG: CPT | Mod: CPTII,S$GLB,, | Performed by: INTERNAL MEDICINE

## 2022-08-26 PROCEDURE — 3044F HG A1C LEVEL LT 7.0%: CPT | Mod: CPTII,S$GLB,, | Performed by: INTERNAL MEDICINE

## 2022-08-26 PROCEDURE — 3008F PR BODY MASS INDEX (BMI) DOCUMENTED: ICD-10-PCS | Mod: CPTII,S$GLB,, | Performed by: INTERNAL MEDICINE

## 2022-08-26 PROCEDURE — 99214 OFFICE O/P EST MOD 30 MIN: CPT | Mod: S$GLB,,, | Performed by: INTERNAL MEDICINE

## 2022-08-26 PROCEDURE — 3008F BODY MASS INDEX DOCD: CPT | Mod: CPTII,S$GLB,, | Performed by: INTERNAL MEDICINE

## 2022-08-26 PROCEDURE — 3078F PR MOST RECENT DIASTOLIC BLOOD PRESSURE < 80 MM HG: ICD-10-PCS | Mod: CPTII,S$GLB,, | Performed by: INTERNAL MEDICINE

## 2022-08-26 PROCEDURE — 1159F MED LIST DOCD IN RCRD: CPT | Mod: CPTII,S$GLB,, | Performed by: INTERNAL MEDICINE

## 2022-08-26 PROCEDURE — 99214 PR OFFICE/OUTPT VISIT, EST, LEVL IV, 30-39 MIN: ICD-10-PCS | Mod: S$GLB,,, | Performed by: INTERNAL MEDICINE

## 2022-08-26 PROCEDURE — 99999 PR PBB SHADOW E&M-EST. PATIENT-LVL IV: CPT | Mod: PBBFAC,,, | Performed by: INTERNAL MEDICINE

## 2022-08-26 PROCEDURE — 85025 COMPLETE CBC W/AUTO DIFF WBC: CPT | Mod: 91 | Performed by: INTERNAL MEDICINE

## 2022-08-26 PROCEDURE — 3074F PR MOST RECENT SYSTOLIC BLOOD PRESSURE < 130 MM HG: ICD-10-PCS | Mod: CPTII,S$GLB,, | Performed by: INTERNAL MEDICINE

## 2022-08-26 NOTE — PROGRESS NOTES
PROGRESS NOTE    Subjective:       Patient ID: Saadia Cancino is a 56 y.o. female.  MRN: [id number]  : 1965    Chief Complaint: Breast Cancer     History of Present Illness:   Saadia Cancino is a 56 y.o. female who presents with left breast, invasive ductal carcinoma. Prior [facility] patient.    See oncology history below.    She underwent left shoulder surgery for rotator cuff repair in 2021,has completed physical therapy.    She was recently diagnosed with pre diabetes.  She reports cutting out sugar from her diet and felt tired and had muscle pain, symptoms are improving after adding wheat bread etc.     [place] [facility] [place], study normal and a one year screening mammogram was recommended.      Neuropathy is stable, on gabapentin 300 mg po qhs.        She reports a stable weight, appetite and performance status.  She is up-to-date with preventive care.        Oncology History:    Briefly, diagnosed with left breast invasive ductal carcinoma in 2018. She underwent lumpectomy and sentinel node exam followed by adjuvant chemotherapy and radiation therapy.  She has been RANDALL since then.  Grade 3 invasive ductal carcinoma, ER negative, WA 3%, HER2/ian not amplified, pT2 N1mi anatomic Stage IIB, clinical stage IIIA     Oncology History   Malignant neoplasm of central portion of left breast in female, estrogen receptor negative   2018 Cancer Staged    Staging form: Breast, AJCC 8th Edition  - Clinical stage from 2018: Stage IIIA (cT2, cN1mi(sn), cM0, G3, ER-, WA+, HER2-)     3/31/2021 Initial Diagnosis    Malignant neoplasm of central portion of left breast in female, estrogen receptor positive         History:  No past medical history on file.   Past Surgical History:   Procedure Laterality Date    cyst removed Right      No family history on file.  Social History     Tobacco Use    Smoking status:  "Never Smoker    Smokeless tobacco: Never Used   Substance and Sexual Activity    Alcohol use: Yes     Comment: ocassional    Drug use: No    Sexual activity: Not on file        ROS:   Review of Systems   Constitutional: Negative for fever, malaise/fatigue and weight loss.   HENT: Negative for congestion, hearing loss, nosebleeds and sore throat.    Eyes: Negative for double vision and photophobia.   Respiratory: Negative for cough, hemoptysis, sputum production, shortness of breath and wheezing.    Cardiovascular: Negative for chest pain, palpitations, orthopnea and leg swelling.   Gastrointestinal: Negative for abdominal pain, blood in stool, constipation, diarrhea, heartburn, nausea and vomiting.   Genitourinary: Negative for dysuria, hematuria and urgency.   Musculoskeletal: Negative for back pain, joint pain and myalgias.   Skin: Negative for itching and rash.   Neurological: Positive for tingling. Negative for dizziness, speech change, seizures, weakness and headaches.   Endo/Heme/Allergies: Negative for polydipsia. Does not bruise/bleed easily.   Psychiatric/Behavioral: Negative for depression and memory loss. The patient is not nervous/anxious and does not have insomnia.         Objective:     Vitals:    08/26/22 0822   BP: 123/75   Pulse: 78   Resp: 18   Temp: 98.2 °F (36.8 °C)   TempSrc: Oral   SpO2: 99%   Weight: 80.4 kg (177 lb 5.8 oz)   Height: 5' 2" (1.575 m)   PainSc: 0-No pain     Wt Readings from Last 10 Encounters:   08/26/22 80.4 kg (177 lb 5.8 oz)   08/10/22 80.7 kg (177 lb 14.4 oz)   03/30/22 81.5 kg (179 lb 10.8 oz)   09/23/21 84.2 kg (185 lb 10 oz)   04/09/21 84.4 kg (186 lb 1.6 oz)   03/31/21 85.6 kg (188 lb 11.4 oz)   12/05/17 77.1 kg (170 lb)   12/01/17 77.1 kg (170 lb)   11/27/17 77.1 kg (170 lb)       Physical Examination:   Physical Exam  Vitals and nursing note reviewed.   Constitutional:       General: She is not in acute distress.     Appearance: She is not diaphoretic.   HENT:     "  Head: Normocephalic.      Mouth/Throat:      Pharynx: No oropharyngeal exudate.   Eyes:      General: No scleral icterus.     Conjunctiva/sclera: Conjunctivae normal.   Neck:      Thyroid: No thyromegaly.   Cardiovascular:      Rate and Rhythm: Normal rate and regular rhythm.      Heart sounds: Normal heart sounds. No murmur heard.  Pulmonary:      Effort: Pulmonary effort is normal. No respiratory distress.      Breath sounds: No stridor. No wheezing or rales.   Chest:      Chest wall: No tenderness.   Abdominal:      General: Bowel sounds are normal. There is no distension.      Palpations: Abdomen is soft. There is no mass.      Tenderness: There is no abdominal tenderness. There is no rebound.   Musculoskeletal:         General: No tenderness or deformity. Normal range of motion.      Cervical back: Neck supple.   Lymphadenopathy:      Cervical: No cervical adenopathy.   Skin:     General: Skin is warm and dry.      Findings: No erythema or rash.      Comments: No breast mass, skin or nipple changes or axillary adenopathy bilaterally. Stable left breast post op changes.    Neurological:      Mental Status: She is alert and oriented to person, place, and time.      Cranial Nerves: No cranial nerve deficit.      Coordination: Coordination normal.      Gait: Gait is intact.   Psychiatric:         Mood and Affect: Affect normal.         Cognition and Memory: Memory normal.         Judgment: Judgment normal.          Diagnostic Tests:  Significant Imaging: I have reviewed and interpreted all pertinent imaging results/findings.    Laboratory Data:  All pertinent labs have been reviewed.  Labs:   Lab Results   Component Value Date    WBC 2.81 (L) 08/26/2022    RBC 4.55 08/26/2022    HGB 12.7 08/26/2022    HCT 37.3 08/26/2022    MCV 82 08/26/2022     08/26/2022    GLU 94 08/26/2022     08/26/2022    K 4.0 08/26/2022    BUN 11 08/26/2022    CREATININE 0.8 08/26/2022    AST 19 08/26/2022    ALT 17 08/26/2022     BILITOT 0.6 08/26/2022       Assessment/Plan:   Malignant neoplasm of central portion of left breast in female, estrogen receptor negative  Grade 3, ER negative, HI 3% positive, HER2/ian not amplified,[id number], clinical prognostic stage IIIA     Diagnosed in April 2018, status post lumpectomy, adjuvant chemotherapy with dose dense AC +paclitaxel x8 of 12 due to peripheral neuropathy.  She underwent adjuvant radiation therapy.     Clinically, she was triple negative with very low HI positivity and endocrine therapy was deferred and that was her preference as well.  Refer for genetic testing given clinically TNBC status.     Continue active surveillance.   Mammogram is normal in May at [facility], repeat in one year.   She is up-to-date with her colonoscopy, Pap smear.   I will see her back in six months as long as she is stable.      Neutropenia   Wbc is lower compared to her baseline. ANC is 1.5. Will complete work up with repeat cbc, smear review, Hep and HIV panel, THERESE, RF, B12 and folate and make further recommendations based on initial testing.       Peripheral neuropathy due to chemotherapy  Continue gabapentin.     Pre diabetes  Refer to Dietician. Agree with increasing physical activity.      ECOG SCORE    0 - Fully active-able to carry on all pre-disease performance without restriction           Discussion:   No follow-ups on file.    Plan was discussed with the patient at length, and she verbalized understanding. Saadia was given an opportunity to ask questions that were answered to her satisfaction, and she was advised to call in the interval if any problems or questions arise.    Electronically signed by Lisha Damico MD      Route Chart for Scheduling    Med Onc Chart Routing      Follow up with physician 6 months. Refer to dietician    Follow up with MARILY    Infusion scheduling note    Injection scheduling note    Labs CBC and CMP   Lab interval:  Labs today, just cbc cmp in 6 months    Imaging    Pharmacy  appointment    Other referrals

## 2022-08-29 ENCOUNTER — TELEPHONE (OUTPATIENT)
Dept: HEMATOLOGY/ONCOLOGY | Facility: CLINIC | Age: 57
End: 2022-08-29
Payer: COMMERCIAL

## 2022-10-07 ENCOUNTER — PATIENT MESSAGE (OUTPATIENT)
Dept: HEMATOLOGY/ONCOLOGY | Facility: CLINIC | Age: 57
End: 2022-10-07
Payer: COMMERCIAL

## 2022-10-10 ENCOUNTER — PATIENT MESSAGE (OUTPATIENT)
Dept: HEMATOLOGY/ONCOLOGY | Facility: CLINIC | Age: 57
End: 2022-10-10

## 2022-10-10 ENCOUNTER — OFFICE VISIT (OUTPATIENT)
Dept: HEMATOLOGY/ONCOLOGY | Facility: CLINIC | Age: 57
End: 2022-10-10
Payer: COMMERCIAL

## 2022-10-10 DIAGNOSIS — Z85.3 PERSONAL HISTORY OF BREAST CANCER: ICD-10-CM

## 2022-10-10 DIAGNOSIS — Z71.83 ENCOUNTER FOR NONPROCREATIVE GENETIC COUNSELING: Primary | ICD-10-CM

## 2022-10-10 PROCEDURE — 1159F PR MEDICATION LIST DOCUMENTED IN MEDICAL RECORD: ICD-10-PCS | Mod: CPTII,S$GLB,, | Performed by: NURSE PRACTITIONER

## 2022-10-10 PROCEDURE — 3044F HG A1C LEVEL LT 7.0%: CPT | Mod: CPTII,S$GLB,, | Performed by: NURSE PRACTITIONER

## 2022-10-10 PROCEDURE — 99215 OFFICE O/P EST HI 40 MIN: CPT | Mod: S$GLB,,, | Performed by: NURSE PRACTITIONER

## 2022-10-10 PROCEDURE — 1159F MED LIST DOCD IN RCRD: CPT | Mod: CPTII,S$GLB,, | Performed by: NURSE PRACTITIONER

## 2022-10-10 PROCEDURE — 99999 PR PBB SHADOW E&M-EST. PATIENT-LVL III: ICD-10-PCS | Mod: PBBFAC,,, | Performed by: NURSE PRACTITIONER

## 2022-10-10 PROCEDURE — 3044F PR MOST RECENT HEMOGLOBIN A1C LEVEL <7.0%: ICD-10-PCS | Mod: CPTII,S$GLB,, | Performed by: NURSE PRACTITIONER

## 2022-10-10 PROCEDURE — 99215 PR OFFICE/OUTPT VISIT, EST, LEVL V, 40-54 MIN: ICD-10-PCS | Mod: S$GLB,,, | Performed by: NURSE PRACTITIONER

## 2022-10-10 PROCEDURE — 99999 PR PBB SHADOW E&M-EST. PATIENT-LVL III: CPT | Mod: PBBFAC,,, | Performed by: NURSE PRACTITIONER

## 2022-10-10 NOTE — PROGRESS NOTES
"Cancer Genetics  Hereditary and High-Risk Clinic  Department of Hematology and Oncology  [facility]    Date of Service:  10/10/22  Visit Provider:  Ryder Causey DNP  Collaborating Physician:  Sonam Mathias MD    Patient ID  Name: Saadia Cancino    : 1965    MRN: [id number]      Referring Provider  Lisha Damico MD  [address] [place] [address]    SUBJECTIVE      Chief Complaint: Genetic Evaluation    History of Present Illness (HPI):  Saadia Cancino ("Saadia"), 56 y.o., assigned female sex at birth, is established with the [facility] of [facility] but new to me.  She was referred by Medical Oncology for cancer genetic risk assessment given her personal history of breast cancer.    Focused Medical History  Germline cancer genetic testing:  None located in chart, but patient believes she had a saliva-based test in 2018 on "[facility]" that showed she had "like a 1.8% chance"   Cancer:  Yes  Invasive ductal carcinoma of the left breast, ER(-),WA(3%),HER2(-), diagnosed at age 52y (2018)  Dr. Damico states this was essentially a triple-negative breast cancer  Treatment:  Lumpectomy and sentinel lymph node exam  Adjuvant chemotherapy and XRT  Colon polyp:  No  2019 colonoscopy (age 53y):  No polyps; 5- to 10-year repeat screening colonoscopy was recommended by the GI provider at that time  Other benign tumor/mass:  Yes  Benign (right?) breast mass in high school  Pancreatitis:  No  Blood disorder:  No    Focused Surgical History  Reproductive organs intact    Tobacco Use  Never smoker    Ancestry  Ashkenazi Adventist ancestry:  Does not believe so  Consanguinity:  No    Family Oncologic History  ** The pedigree below was placed into this note in a size that produced a legible font.  If it is appearing small/illegible on your screen, expand this note window horizontally. **    Hereditary cancer genetic testing in blood relatives:  No  Other " than noted, no known history of cancer in relatives depicted in the pedigree or in:  maternal first cousins, maternal extended family, paternal first cousins, paternal extended family.  Other than noted, no known family history of benign tumors or colon polyps.    Review of Systems  See HPI.       OBJECTIVE      Past Medical History:   Diagnosis Date    Breast lump     benign, while patient was in high school (right breast?)     Patient Active Problem List    Diagnosis Date Noted    Neutropenia 08/26/2022    Pre-diabetes 08/26/2022    Class 1 obesity due to excess calories without serious comorbidity with body mass index (BMI) of 32.0 to 32.9 in adult 08/10/2022    Menopausal state 05/02/2022    Malignant neoplasm of central portion of left breast in female, estrogen receptor negative 03/31/2021    Peripheral neuropathy due to chemotherapy 03/31/2021    Lymphopenia 03/31/2021    Lumbar sprain 12/14/2017      Physical Exam  Very pleasant patient.  Unaccompanied.  Vitals signs:  Reviewed:  Vitals:    10/10/22 0835   PainSc: 0-No pain   Distress score:  Reviewed: (0/10).  Constitutional      Appearance:  Appears well developed and well nourished. No distress.   Pulmonary     Effort:  Normal.  Neurological     Mental Status:  Alert and oriented.     Coordination:  Normal.   Psychiatric         Mood and Affect:  Normal.     Thought Content:  Normal.     Speech:  Normal.     Behavior:  Normal.     Judgment:  Normal.  Genetics-specific     It is my assessment that the patient is ready to proceed with cancer genetic testing from a psychosocial perspective.    CANCER GENETIC COUNSELING      Cancer Genetics     Germline cancer genetic testing is the testing of genes associated with cancer, known as cancer susceptibility genes.  Just as these genes are inherited from parents--one copy of each gene from each parent--mutations in these genes can be inherited, as well.  A mutation in a cancer susceptibility gene adversely affects  the gene's ability to prevent cancer; therefore, carriers of cancer susceptibility gene mutations may be at increased risk for certain cancers.     Causes of Cancer    Only approximately 5%-10% of cancers are caused by an inherited cancer susceptibility gene mutation; rather, the majority of cancers are sporadic.  Causes of sporadic cancers may include environmental risk factors, lifestyle risk factors, and non-modifiable risk factors.  It is important to note that members of a family often share not only their genetics but also other risk factors, including environmental and lifestyle risk factors, so cancers can be familial.     Potential Results of Genetic Testing, and Their Implications     Potential results of genetic testing include positive, negative, and variant of unknown significance (VUS).    Positive:  A positive result indicates the presence of at least one clinically significant gene mutation, and the individual's associated cancer risks vary depending upon the cancer susceptibility gene(s) in which there is/are a mutation(s).  With a positive result, depending upon the specific result and the individual's clinical history, modified risk management may be recommended, including measures for risk reduction and/or surveillance; however, there are not always effective strategies for modified risk management.  Negative:  A negative result indicates that no clinically significant mutations were identified in the gene(s) tested.    VUS:  A VUS indicates that there is not presently enough data for the laboratory to make a determination as to whether the genetic variant is clinically significant.  VUSs are not typically acted upon clinically.       Genetic Mutation Inheritance     When an individual tests positive for a gene mutation, her first-degree relatives each have a 50% chance of carrying the same mutation, and other, more distant blood relatives can also be at risk of carrying the same mutation.        Genetic Discrimination     Genetic discrimination occurs when individuals are discriminated against on the basis of their genetic information.    The Genetic Information Nondiscrimination Act (SHEA) is [place] federal legislation that provides some protections against use of an individual's genetic information by their health insurer and by their employer.  The are two parts to SHEA:  Title I and Title II.  Title I of SHEA prohibits most health insurers from utilizing an individual's genetic information to make decisions regarding insurance eligibility or premium charges.  SHEA does not protect individuals from genetic discrimination toward health insurance obtained through a job with the  or through the Federal Employees Health Benefits Plan.  Title II of SHEA prohibits covered entities, including employers, from requesting the genetic information of employees and applicants.  SHEA does not protect individuals from genetic discrimination by employers with fewer than 15 employees or if employed by the .    SHEA does not protect individuals from genetic discrimination by any other type of policy or entity, including but not limited to life insurance, disability insurance, long-term care insurance,  benefits, and Citizen of [place] [employer] benefits.    Genetic Testing Logistics     An outside laboratory would perform the testing after a blood sample is collected here at the [facility] or a saliva sample is collected by the patient at home.      There is a potential for the patient to incur out-of-pocket costs related to genetic testing.    One can expect this genetic testing to take approximately three weeks to result.    Post-test genetic counseling can be conducted once the genetic testing results are available.     Cancer Genetics Impression    From a clinical standpoint, hereditary cancer susceptibility gene testing related to the personal history of  essentially triple-negative breast  "cancer  is recommended.  It is unclear whether Saadia has previously undergone such, but she states she would have these records at home and will go through them today and send for my review, after which I will provide recommendations and coordinate sample collection as indicated.    ASSESSMENT / PLAN       ICD-10-CM ICD-9-CM   1. Encounter for nonprocreative genetic counseling  Z71.83 V26.33   2. Personal history of breast cancer  Z85.3 V10.3     1. Encounter for nonprocreative genetic counseling  Saadia Cancino ("Saadia"), 56 y.o., presented today for cancer genetic risk assessment given her personal history of breast cancer.  Cancer genetic risk assessment and pre-test cancer genetic counseling were conducted.  From a clinical standpoint, hereditary cancer susceptibility gene testing related to the personal history of essentially triple-negative breast cancer is recommended.  It is unclear whether Saadia has previously undergone such, but she states she would have these records at home and will go through them today and send for my review (she states she will try to get them to me by this Thursday--3 days from today), after which I will provide recommendations and coordinate sample collection as indicated.      2. Personal history of breast cancer  - Ambulatory referral/consult to Genetics - Completed     Follow-up:  Follow up in about 6 months (around 4/10/2023) for re-discussion regarding genetic testing, if the patient does not reach out prior.    Questions were encouraged and answered to the patient's satisfaction, and she verbalized understanding of the information and agreement with the plan.           Approximately 39 minutes were spent face-to-face with the patient.  Approximately 54 minutes in total were spent on this encounter, which includes face-to-face time and non-face-to-face time preparing to see the patient (e.g., review of tests), obtaining and/or reviewing separately obtained history, " documenting clinical information in the electronic or other health record, independently interpreting results (not separately reported) and communicating results to the patient/family/caregiver, or care coordination (not separately reported).         Ryder Causey, DNP, APRN, FNP-BC, AOCNP, CGRA  Nurse Practitioner, Hereditary and High-Risk Clinic  Department of Hematology and Oncology  [facility]

## 2023-03-15 ENCOUNTER — TELEPHONE (OUTPATIENT)
Dept: HEMATOLOGY/ONCOLOGY | Facility: CLINIC | Age: 58
End: 2023-03-15
Payer: COMMERCIAL

## 2023-03-15 NOTE — TELEPHONE ENCOUNTER
----- Message from Herminia Cueva sent at 3/14/2023 11:16 AM CDT -----  Regarding: Scheduling Request    Appt Type:  EP & Labs    Date/Time Preference:  None    Treating Provider:    Dr. Damico    Caller Name:  Saadia      Contact Preference:  [phone]

## 2023-03-17 ENCOUNTER — LAB VISIT (OUTPATIENT)
Dept: LAB | Facility: HOSPITAL | Age: 58
End: 2023-03-17
Attending: INTERNAL MEDICINE
Payer: COMMERCIAL

## 2023-03-17 ENCOUNTER — OFFICE VISIT (OUTPATIENT)
Dept: HEMATOLOGY/ONCOLOGY | Facility: CLINIC | Age: 58
End: 2023-03-17
Payer: COMMERCIAL

## 2023-03-17 VITALS
OXYGEN SATURATION: 98 % | RESPIRATION RATE: 16 BRPM | SYSTOLIC BLOOD PRESSURE: 112 MMHG | HEART RATE: 83 BPM | WEIGHT: 176.38 LBS | BODY MASS INDEX: 32.46 KG/M2 | HEIGHT: 62 IN | TEMPERATURE: 98 F | DIASTOLIC BLOOD PRESSURE: 69 MMHG

## 2023-03-17 DIAGNOSIS — D70.9 NEUTROPENIA, UNSPECIFIED TYPE: ICD-10-CM

## 2023-03-17 DIAGNOSIS — Z71.83 ENCOUNTER FOR NONPROCREATIVE GENETIC COUNSELING: ICD-10-CM

## 2023-03-17 DIAGNOSIS — C50.112 MALIGNANT NEOPLASM OF CENTRAL PORTION OF LEFT BREAST IN FEMALE, ESTROGEN RECEPTOR POSITIVE: ICD-10-CM

## 2023-03-17 DIAGNOSIS — Z17.0 MALIGNANT NEOPLASM OF CENTRAL PORTION OF LEFT BREAST IN FEMALE, ESTROGEN RECEPTOR POSITIVE: Primary | ICD-10-CM

## 2023-03-17 DIAGNOSIS — Z17.0 MALIGNANT NEOPLASM OF CENTRAL PORTION OF LEFT BREAST IN FEMALE, ESTROGEN RECEPTOR POSITIVE: ICD-10-CM

## 2023-03-17 DIAGNOSIS — C50.112 MALIGNANT NEOPLASM OF CENTRAL PORTION OF LEFT BREAST IN FEMALE, ESTROGEN RECEPTOR POSITIVE: Primary | ICD-10-CM

## 2023-03-17 LAB
BASOPHILS # BLD AUTO: 0.02 K/UL (ref 0–0.2)
BASOPHILS # BLD AUTO: 0.02 K/UL (ref 0–0.2)
BASOPHILS NFR BLD: 0.7 % (ref 0–1.9)
BASOPHILS NFR BLD: 0.7 % (ref 0–1.9)
DIFFERENTIAL METHOD: ABNORMAL
DIFFERENTIAL METHOD: ABNORMAL
EOSINOPHIL # BLD AUTO: 0 K/UL (ref 0–0.5)
EOSINOPHIL # BLD AUTO: 0 K/UL (ref 0–0.5)
EOSINOPHIL NFR BLD: 0.7 % (ref 0–8)
EOSINOPHIL NFR BLD: 0.7 % (ref 0–8)
ERYTHROCYTE [DISTWIDTH] IN BLOOD BY AUTOMATED COUNT: 13.8 % (ref 11.5–14.5)
ERYTHROCYTE [DISTWIDTH] IN BLOOD BY AUTOMATED COUNT: 13.8 % (ref 11.5–14.5)
HCT VFR BLD AUTO: 37 % (ref 37–48.5)
HCT VFR BLD AUTO: 37 % (ref 37–48.5)
HGB BLD-MCNC: 12.1 G/DL (ref 12–16)
HGB BLD-MCNC: 12.1 G/DL (ref 12–16)
IMM GRANULOCYTES # BLD AUTO: 0.01 K/UL (ref 0–0.04)
IMM GRANULOCYTES # BLD AUTO: 0.01 K/UL (ref 0–0.04)
IMM GRANULOCYTES NFR BLD AUTO: 0.4 % (ref 0–0.5)
IMM GRANULOCYTES NFR BLD AUTO: 0.4 % (ref 0–0.5)
LYMPHOCYTES # BLD AUTO: 0.7 K/UL (ref 1–4.8)
LYMPHOCYTES # BLD AUTO: 0.7 K/UL (ref 1–4.8)
LYMPHOCYTES NFR BLD: 25.5 % (ref 18–48)
LYMPHOCYTES NFR BLD: 25.5 % (ref 18–48)
MCH RBC QN AUTO: 27.1 PG (ref 27–31)
MCH RBC QN AUTO: 27.1 PG (ref 27–31)
MCHC RBC AUTO-ENTMCNC: 32.7 G/DL (ref 32–36)
MCHC RBC AUTO-ENTMCNC: 32.7 G/DL (ref 32–36)
MCV RBC AUTO: 83 FL (ref 82–98)
MCV RBC AUTO: 83 FL (ref 82–98)
MONOCYTES # BLD AUTO: 0.3 K/UL (ref 0.3–1)
MONOCYTES # BLD AUTO: 0.3 K/UL (ref 0.3–1)
MONOCYTES NFR BLD: 11.6 % (ref 4–15)
MONOCYTES NFR BLD: 11.6 % (ref 4–15)
NEUTROPHILS # BLD AUTO: 1.6 K/UL (ref 1.8–7.7)
NEUTROPHILS # BLD AUTO: 1.6 K/UL (ref 1.8–7.7)
NEUTROPHILS NFR BLD: 61.1 % (ref 38–73)
NEUTROPHILS NFR BLD: 61.1 % (ref 38–73)
NRBC BLD-RTO: 0 /100 WBC
NRBC BLD-RTO: 0 /100 WBC
PLATELET # BLD AUTO: 164 K/UL (ref 150–450)
PLATELET # BLD AUTO: 164 K/UL (ref 150–450)
PMV BLD AUTO: 10.3 FL (ref 9.2–12.9)
PMV BLD AUTO: 10.3 FL (ref 9.2–12.9)
RBC # BLD AUTO: 4.46 M/UL (ref 4–5.4)
RBC # BLD AUTO: 4.46 M/UL (ref 4–5.4)
WBC # BLD AUTO: 2.67 K/UL (ref 3.9–12.7)
WBC # BLD AUTO: 2.67 K/UL (ref 3.9–12.7)

## 2023-03-17 PROCEDURE — 3078F DIAST BP <80 MM HG: CPT | Mod: CPTII,S$GLB,, | Performed by: INTERNAL MEDICINE

## 2023-03-17 PROCEDURE — 36415 COLL VENOUS BLD VENIPUNCTURE: CPT | Performed by: INTERNAL MEDICINE

## 2023-03-17 PROCEDURE — 3074F PR MOST RECENT SYSTOLIC BLOOD PRESSURE < 130 MM HG: ICD-10-PCS | Mod: CPTII,S$GLB,, | Performed by: INTERNAL MEDICINE

## 2023-03-17 PROCEDURE — 3008F PR BODY MASS INDEX (BMI) DOCUMENTED: ICD-10-PCS | Mod: CPTII,S$GLB,, | Performed by: INTERNAL MEDICINE

## 2023-03-17 PROCEDURE — 1159F MED LIST DOCD IN RCRD: CPT | Mod: CPTII,S$GLB,, | Performed by: INTERNAL MEDICINE

## 2023-03-17 PROCEDURE — 3008F BODY MASS INDEX DOCD: CPT | Mod: CPTII,S$GLB,, | Performed by: INTERNAL MEDICINE

## 2023-03-17 PROCEDURE — 99999 PR PBB SHADOW E&M-EST. PATIENT-LVL III: ICD-10-PCS | Mod: PBBFAC,,, | Performed by: INTERNAL MEDICINE

## 2023-03-17 PROCEDURE — 1159F PR MEDICATION LIST DOCUMENTED IN MEDICAL RECORD: ICD-10-PCS | Mod: CPTII,S$GLB,, | Performed by: INTERNAL MEDICINE

## 2023-03-17 PROCEDURE — 3074F SYST BP LT 130 MM HG: CPT | Mod: CPTII,S$GLB,, | Performed by: INTERNAL MEDICINE

## 2023-03-17 PROCEDURE — 99214 PR OFFICE/OUTPT VISIT, EST, LEVL IV, 30-39 MIN: ICD-10-PCS | Mod: S$GLB,,, | Performed by: INTERNAL MEDICINE

## 2023-03-17 PROCEDURE — 99999 PR PBB SHADOW E&M-EST. PATIENT-LVL III: CPT | Mod: PBBFAC,,, | Performed by: INTERNAL MEDICINE

## 2023-03-17 PROCEDURE — 85025 COMPLETE CBC W/AUTO DIFF WBC: CPT | Performed by: INTERNAL MEDICINE

## 2023-03-17 PROCEDURE — 3078F PR MOST RECENT DIASTOLIC BLOOD PRESSURE < 80 MM HG: ICD-10-PCS | Mod: CPTII,S$GLB,, | Performed by: INTERNAL MEDICINE

## 2023-03-17 PROCEDURE — 99214 OFFICE O/P EST MOD 30 MIN: CPT | Mod: S$GLB,,, | Performed by: INTERNAL MEDICINE

## 2023-03-17 NOTE — PROGRESS NOTES
PROGRESS NOTE    Subjective:       Patient ID: Saadia Cancino is a 57 y.o. female.  MRN: [id number]  : 1965    Chief Complaint: Breast Cancer     History of Present Illness:   Saadia Cancino is a 57 y.o. female who presents with left breast, invasive ductal carcinoma. Prior EJ patient.    See oncology history below.    She underwent left shoulder surgery for rotator cuff repair in 2021,has completed physical therapy.    She was recently diagnosed with pre diabetes. Continues PMD follow up, has lost some weight with diet and exercise.     Mammogram was done in May 2022 at [facility] [place],dense breasts reported, study normal and a one year screening mammogram was recommended.      Neuropathy is stable, on gabapentin 300 mg po qhs off and on.      Lost her father 2 weeks ago, is doing ok.   She is up-to-date with preventive care.        Oncology History:    Briefly, diagnosed with left breast invasive ductal carcinoma in 2018. She underwent lumpectomy and sentinel node exam followed by adjuvant chemotherapy and radiation therapy.  She has been RANDALL since then.  Grade 3 invasive ductal carcinoma, ER negative, RI 3%, HER2/ian not amplified, pT2 N1mi anatomic Stage IIB, clinical stage IIIA     Oncology History   Malignant neoplasm of central portion of left breast in female, estrogen receptor negative   2018 Cancer Staged    Staging form: Breast, AJCC 8th Edition  - Clinical stage from 2018: Stage IIIA (cT2, cN1mi(sn), cM0, G3, ER-, RI+, HER2-)       3/31/2021 Initial Diagnosis    Malignant neoplasm of central portion of left breast in female, estrogen receptor positive         History:  Past Medical History:   Diagnosis Date    Breast lump     benign, while patient was in high school (right breast?)      Past Surgical History:   Procedure Laterality Date    cyst removed Right      Family History   Problem Relation Age of Onset    Colon  "polyps Father 79        pt believes (just a couple)    Cancer Other         origin?    Cancer Other         origin?    Cancer Paternal Uncle         pt believes (prostate?); does not believe was COD    Breast cancer Other 65    Cancer Other         (prostate?)     Social History     Tobacco Use    Smoking status: Never    Smokeless tobacco: Never   Substance and Sexual Activity    Alcohol use: Yes     Comment: ocassional    Drug use: No    Sexual activity: Not on file        ROS:   Review of Systems   Constitutional:  Negative for fever, malaise/fatigue and weight loss.   HENT:  Negative for congestion, hearing loss, nosebleeds and sore throat.    Eyes:  Negative for double vision and photophobia.   Respiratory:  Negative for cough, hemoptysis, sputum production, shortness of breath and wheezing.    Cardiovascular:  Negative for chest pain, palpitations, orthopnea and leg swelling.   Gastrointestinal:  Negative for abdominal pain, blood in stool, constipation, diarrhea, heartburn, nausea and vomiting.   Genitourinary:  Negative for dysuria, hematuria and urgency.   Musculoskeletal:  Negative for back pain, joint pain and myalgias.   Skin:  Negative for itching and rash.   Neurological:  Positive for tingling. Negative for dizziness, speech change, seizures, weakness and headaches.   Endo/Heme/Allergies:  Negative for polydipsia. Does not bruise/bleed easily.   Psychiatric/Behavioral:  Negative for depression and memory loss. The patient is not nervous/anxious and does not have insomnia.       Objective:     Vitals:    03/17/23 0950   BP: 112/69   Pulse: 83   Resp: 16   Temp: 98 °F (36.7 °C)   TempSrc: Oral   SpO2: 98%   Weight: 80 kg (176 lb 5.9 oz)   Height: 5' 2" (1.575 m)   PainSc: 0-No pain     Wt Readings from Last 10 Encounters:   03/17/23 80 kg (176 lb 5.9 oz)   08/26/22 80.4 kg (177 lb 5.8 oz)   08/10/22 80.7 kg (177 lb 14.4 oz)   03/30/22 81.5 kg (179 lb 10.8 oz)   09/23/21 84.2 kg (185 lb 10 oz)   04/09/21 " 84.4 kg (186 lb 1.6 oz)   03/31/21 85.6 kg (188 lb 11.4 oz)   12/05/17 77.1 kg (170 lb)   12/01/17 77.1 kg (170 lb)   11/27/17 77.1 kg (170 lb)       Physical Examination:   Physical Exam  Vitals and nursing note reviewed.   Constitutional:       General: She is not in acute distress.     Appearance: She is not diaphoretic.   HENT:      Head: Normocephalic.      Mouth/Throat:      Pharynx: No oropharyngeal exudate.   Eyes:      General: No scleral icterus.     Conjunctiva/sclera: Conjunctivae normal.   Neck:      Thyroid: No thyromegaly.   Cardiovascular:      Rate and Rhythm: Normal rate and regular rhythm.      Heart sounds: Normal heart sounds. No murmur heard.  Pulmonary:      Effort: Pulmonary effort is normal. No respiratory distress.      Breath sounds: No stridor. No wheezing or rales.   Chest:      Chest wall: No tenderness.   Abdominal:      General: Bowel sounds are normal. There is no distension.      Palpations: Abdomen is soft. There is no mass.      Tenderness: There is no abdominal tenderness. There is no rebound.   Musculoskeletal:         General: No tenderness or deformity. Normal range of motion.      Cervical back: Neck supple.   Lymphadenopathy:      Cervical: No cervical adenopathy.   Skin:     General: Skin is warm and dry.      Findings: No erythema or rash.      Comments: No breast mass, skin or nipple changes or axillary adenopathy bilaterally. Stable left breast post op changes.    Neurological:      Mental Status: She is alert and oriented to person, place, and time.      Cranial Nerves: No cranial nerve deficit.      Coordination: Coordination normal.      Gait: Gait is intact.   Psychiatric:         Mood and Affect: Affect normal.         Cognition and Memory: Memory normal.         Judgment: Judgment normal.        Diagnostic Tests:  Significant Imaging: I have reviewed and interpreted all pertinent imaging results/findings.    Laboratory Data:  All pertinent labs have been  reviewed.  Labs:   Lab Results   Component Value Date    WBC 2.67 (L) 03/17/2023    WBC 2.67 (L) 03/17/2023    RBC 4.46 03/17/2023    RBC 4.46 03/17/2023    HGB 12.1 03/17/2023    HGB 12.1 03/17/2023    HCT 37.0 03/17/2023    HCT 37.0 03/17/2023    MCV 83 03/17/2023    MCV 83 03/17/2023     03/17/2023     03/17/2023    GLU 94 08/26/2022     08/26/2022    K 4.0 08/26/2022    BUN 11 08/26/2022    CREATININE 0.8 08/26/2022    AST 19 08/26/2022    ALT 17 08/26/2022    BILITOT 0.6 08/26/2022       Assessment/Plan:   Malignant neoplasm of central portion of left breast in female, estrogen receptor negative  Grade 3, ER negative, HI 3% positive, HER2/ian not amplified,[id number], clinical prognostic stage IIIA     Diagnosed in April 2018, status post lumpectomy, adjuvant chemotherapy with dose dense AC +paclitaxel x8 of 12 due to peripheral neuropathy.  She underwent adjuvant radiation therapy.     Clinically, she was triple negative with very low HI positivity and endocrine therapy was deferred and that was her preference as well.  BRCA testing negative at diagnosis.     Continue active surveillance.   Mammogram is normal in May at [facility], repeat in one year. Discussed the recommendations for screening MRI for patients with dense breasts, she will request her gynecologist to order, let me know in case of any problems.     She is up-to-date with her colonoscopy, Pap smear.   I will see her back in six months as long as she is stable.      Neutropenia   Work up including smear review, Hep and HIV panel, THERESE, RF, B12 and folate are all normal. Cbc has been in the low 3 - mid 2 range for the past 2 years. 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 Discussed bone marrow biopsy, she wants to hold off at this time. Continue to monitor.       Peripheral neuropathy due to chemotherapy  Continue gabapentin as needed.      Pre diabetes  Continue diet and exercise and PMD follow up.      ECOG  SCORE               Discussion:   No follow-ups on file.    Plan was discussed with the patient at length, and she verbalized understanding. Saadia was given an opportunity to ask questions that were answered to her satisfaction, and she was advised to call in the interval if any problems or questions arise.    Electronically signed by Lisha Damico MD      Route Chart for Scheduling    Med Onc Chart Routing      Follow up with physician 6 months.   Follow up with MARILY    Infusion scheduling note    Injection scheduling note    Labs CBC   Scheduling:  Preferred lab:  Lab interval:     Imaging    Pharmacy appointment    Other referrals

## 2023-09-06 ENCOUNTER — TELEPHONE (OUTPATIENT)
Dept: HEMATOLOGY/ONCOLOGY | Facility: CLINIC | Age: 58
End: 2023-09-06
Payer: COMMERCIAL

## 2023-09-06 DIAGNOSIS — Z71.83 ENCOUNTER FOR NONPROCREATIVE GENETIC COUNSELING: Primary | ICD-10-CM

## 2023-09-06 NOTE — TELEPHONE ENCOUNTER
----- Message from Kirti Coy sent at 9/6/2023 10:05 AM CDT -----  Type: General Call Back     Name of Caller:BARAK HILTON [[id number]]  Symptoms:6 mth f/u  Would the patient rather a call back or a response via [facility]? Call back   Best Call Back Number:[phone]  Additional Information: Patient indicates she sent a message yesterday in regards to scheduling an appointment with the provider. Patient indicates she would like to schedule an appointment with the provider. Patient indicates she needs to be seen for her 6 mth follow up. Please call patient back with further assistance and more information if possible.

## 2023-09-29 ENCOUNTER — OFFICE VISIT (OUTPATIENT)
Dept: HEMATOLOGY/ONCOLOGY | Facility: CLINIC | Age: 58
End: 2023-09-29
Payer: COMMERCIAL

## 2023-09-29 ENCOUNTER — LAB VISIT (OUTPATIENT)
Dept: LAB | Facility: HOSPITAL | Age: 58
End: 2023-09-29
Payer: COMMERCIAL

## 2023-09-29 VITALS
WEIGHT: 180 LBS | RESPIRATION RATE: 18 BRPM | SYSTOLIC BLOOD PRESSURE: 118 MMHG | DIASTOLIC BLOOD PRESSURE: 66 MMHG | HEART RATE: 81 BPM | OXYGEN SATURATION: 100 % | BODY MASS INDEX: 33.13 KG/M2 | TEMPERATURE: 98 F | HEIGHT: 62 IN

## 2023-09-29 DIAGNOSIS — Z71.83 ENCOUNTER FOR NONPROCREATIVE GENETIC COUNSELING: ICD-10-CM

## 2023-09-29 DIAGNOSIS — C50.112 MALIGNANT NEOPLASM OF CENTRAL PORTION OF LEFT BREAST IN FEMALE, ESTROGEN RECEPTOR NEGATIVE: Primary | ICD-10-CM

## 2023-09-29 DIAGNOSIS — G62.0 PERIPHERAL NEUROPATHY DUE TO CHEMOTHERAPY: ICD-10-CM

## 2023-09-29 DIAGNOSIS — T45.1X5A PERIPHERAL NEUROPATHY DUE TO CHEMOTHERAPY: ICD-10-CM

## 2023-09-29 DIAGNOSIS — D70.9 NEUTROPENIA, UNSPECIFIED TYPE: ICD-10-CM

## 2023-09-29 DIAGNOSIS — Z17.1 MALIGNANT NEOPLASM OF CENTRAL PORTION OF LEFT BREAST IN FEMALE, ESTROGEN RECEPTOR NEGATIVE: Primary | ICD-10-CM

## 2023-09-29 LAB
ALBUMIN SERPL BCP-MCNC: 3.9 G/DL (ref 3.5–5.2)
ALP SERPL-CCNC: 67 U/L (ref 55–135)
ALT SERPL W/O P-5'-P-CCNC: 19 U/L (ref 10–44)
ANION GAP SERPL CALC-SCNC: 6 MMOL/L (ref 8–16)
AST SERPL-CCNC: 19 U/L (ref 10–40)
BASOPHILS # BLD AUTO: 0.02 K/UL (ref 0–0.2)
BASOPHILS NFR BLD: 0.7 % (ref 0–1.9)
BILIRUB SERPL-MCNC: 0.7 MG/DL (ref 0.1–1)
BUN SERPL-MCNC: 8 MG/DL (ref 6–20)
CALCIUM SERPL-MCNC: 9.6 MG/DL (ref 8.7–10.5)
CHLORIDE SERPL-SCNC: 107 MMOL/L (ref 95–110)
CO2 SERPL-SCNC: 28 MMOL/L (ref 23–29)
CREAT SERPL-MCNC: 0.8 MG/DL (ref 0.5–1.4)
DIFFERENTIAL METHOD: ABNORMAL
EOSINOPHIL # BLD AUTO: 0 K/UL (ref 0–0.5)
EOSINOPHIL NFR BLD: 1.3 % (ref 0–8)
ERYTHROCYTE [DISTWIDTH] IN BLOOD BY AUTOMATED COUNT: 14.4 % (ref 11.5–14.5)
EST. GFR  (NO RACE VARIABLE): >60 ML/MIN/1.73 M^2
GLUCOSE SERPL-MCNC: 94 MG/DL (ref 70–110)
HCT VFR BLD AUTO: 39.3 % (ref 37–48.5)
HGB BLD-MCNC: 12.7 G/DL (ref 12–16)
IMM GRANULOCYTES # BLD AUTO: 0 K/UL (ref 0–0.04)
IMM GRANULOCYTES NFR BLD AUTO: 0 % (ref 0–0.5)
LYMPHOCYTES # BLD AUTO: 0.9 K/UL (ref 1–4.8)
LYMPHOCYTES NFR BLD: 30.9 % (ref 18–48)
MCH RBC QN AUTO: 27.4 PG (ref 27–31)
MCHC RBC AUTO-ENTMCNC: 32.3 G/DL (ref 32–36)
MCV RBC AUTO: 85 FL (ref 82–98)
MONOCYTES # BLD AUTO: 0.4 K/UL (ref 0.3–1)
MONOCYTES NFR BLD: 13.3 % (ref 4–15)
NEUTROPHILS # BLD AUTO: 1.6 K/UL (ref 1.8–7.7)
NEUTROPHILS NFR BLD: 53.8 % (ref 38–73)
NRBC BLD-RTO: 0 /100 WBC
PLATELET # BLD AUTO: 190 K/UL (ref 150–450)
PMV BLD AUTO: 10.3 FL (ref 9.2–12.9)
POTASSIUM SERPL-SCNC: 3.9 MMOL/L (ref 3.5–5.1)
PROT SERPL-MCNC: 7.3 G/DL (ref 6–8.4)
RBC # BLD AUTO: 4.63 M/UL (ref 4–5.4)
SODIUM SERPL-SCNC: 141 MMOL/L (ref 136–145)
WBC # BLD AUTO: 3.01 K/UL (ref 3.9–12.7)

## 2023-09-29 PROCEDURE — 99999 PR PBB SHADOW E&M-EST. PATIENT-LVL III: ICD-10-PCS | Mod: PBBFAC,,, | Performed by: INTERNAL MEDICINE

## 2023-09-29 PROCEDURE — 3008F PR BODY MASS INDEX (BMI) DOCUMENTED: ICD-10-PCS | Mod: CPTII,S$GLB,, | Performed by: INTERNAL MEDICINE

## 2023-09-29 PROCEDURE — 1160F RVW MEDS BY RX/DR IN RCRD: CPT | Mod: CPTII,S$GLB,, | Performed by: INTERNAL MEDICINE

## 2023-09-29 PROCEDURE — 3074F SYST BP LT 130 MM HG: CPT | Mod: CPTII,S$GLB,, | Performed by: INTERNAL MEDICINE

## 2023-09-29 PROCEDURE — 3074F PR MOST RECENT SYSTOLIC BLOOD PRESSURE < 130 MM HG: ICD-10-PCS | Mod: CPTII,S$GLB,, | Performed by: INTERNAL MEDICINE

## 2023-09-29 PROCEDURE — 85025 COMPLETE CBC W/AUTO DIFF WBC: CPT | Performed by: INTERNAL MEDICINE

## 2023-09-29 PROCEDURE — 99214 PR OFFICE/OUTPT VISIT, EST, LEVL IV, 30-39 MIN: ICD-10-PCS | Mod: S$GLB,,, | Performed by: INTERNAL MEDICINE

## 2023-09-29 PROCEDURE — 1159F MED LIST DOCD IN RCRD: CPT | Mod: CPTII,S$GLB,, | Performed by: INTERNAL MEDICINE

## 2023-09-29 PROCEDURE — 80053 COMPREHEN METABOLIC PANEL: CPT | Performed by: INTERNAL MEDICINE

## 2023-09-29 PROCEDURE — 1160F PR REVIEW ALL MEDS BY PRESCRIBER/CLIN PHARMACIST DOCUMENTED: ICD-10-PCS | Mod: CPTII,S$GLB,, | Performed by: INTERNAL MEDICINE

## 2023-09-29 PROCEDURE — 99999 PR PBB SHADOW E&M-EST. PATIENT-LVL III: CPT | Mod: PBBFAC,,, | Performed by: INTERNAL MEDICINE

## 2023-09-29 PROCEDURE — 99214 OFFICE O/P EST MOD 30 MIN: CPT | Mod: S$GLB,,, | Performed by: INTERNAL MEDICINE

## 2023-09-29 PROCEDURE — 36415 COLL VENOUS BLD VENIPUNCTURE: CPT | Performed by: INTERNAL MEDICINE

## 2023-09-29 PROCEDURE — 1159F PR MEDICATION LIST DOCUMENTED IN MEDICAL RECORD: ICD-10-PCS | Mod: CPTII,S$GLB,, | Performed by: INTERNAL MEDICINE

## 2023-09-29 PROCEDURE — 3008F BODY MASS INDEX DOCD: CPT | Mod: CPTII,S$GLB,, | Performed by: INTERNAL MEDICINE

## 2023-09-29 PROCEDURE — 3078F PR MOST RECENT DIASTOLIC BLOOD PRESSURE < 80 MM HG: ICD-10-PCS | Mod: CPTII,S$GLB,, | Performed by: INTERNAL MEDICINE

## 2023-09-29 PROCEDURE — 3078F DIAST BP <80 MM HG: CPT | Mod: CPTII,S$GLB,, | Performed by: INTERNAL MEDICINE

## 2023-09-29 RX ORDER — ALPRAZOLAM 0.5 MG/1
0.5 TABLET ORAL
Qty: 1 TABLET | Refills: 0 | Status: SHIPPED | OUTPATIENT
Start: 2023-09-29

## 2023-09-29 RX ORDER — GABAPENTIN 300 MG/1
300 CAPSULE ORAL 3 TIMES DAILY
Qty: 90 CAPSULE | Refills: 3 | Status: SHIPPED | OUTPATIENT
Start: 2023-09-29 | End: 2024-01-27

## 2023-09-29 NOTE — PROGRESS NOTES
PROGRESS NOTE    Subjective:       Patient ID: Saadia Cancino is a 57 y.o. female.  MRN: [id number]  : 1965    Chief Complaint: Breast Cancer     History of Present Illness:   Saadia Cancino is a 57 y.o. female who presents with left breast, invasive ductal carcinoma. Prior EJ patient.    See oncology history below.    She underwent left shoulder surgery for rotator cuff repair in 2021,has completed physical therapy.    Mammogram was done in 2023 at [facility] [place],dense breasts reported, study normal and a one year screening mammogram was recommended.      Neuropathy is stable, on gabapentin 300 mg po qhs off and on. Has been noticing some arthritis in her hands as well.      She is up-to-date with preventive care.  Due for a repeat colonoscopy next year.       Oncology History:    Briefly, diagnosed with left breast invasive ductal carcinoma in 2018. She underwent lumpectomy and sentinel node exam followed by adjuvant chemotherapy and radiation therapy.  She has been RANDALL since then.  Grade 3 invasive ductal carcinoma, ER negative, NH 3%, HER2/ian not amplified, pT2 N1mi anatomic Stage IIB, clinical stage IIIA     Oncology History   Malignant neoplasm of central portion of left breast in female, estrogen receptor negative   2018 Cancer Staged    Staging form: Breast, AJCC 8th Edition  - Clinical stage from 2018: Stage IIIA (cT2, cN1mi(sn), cM0, G3, ER-, NH+, HER2-)     3/31/2021 Initial Diagnosis    Malignant neoplasm of central portion of left breast in female, estrogen receptor positive         History:  Past Medical History:   Diagnosis Date    Breast lump     benign, while patient was in high school (right breast?)      Past Surgical History:   Procedure Laterality Date    cyst removed Right      Family History   Problem Relation Age of Onset    Colon polyps Father 79        pt believes (just a couple)    Cancer Other          "origin?    Cancer Other         origin?    Cancer Paternal Uncle         pt believes (prostate?); does not believe was COD    Breast cancer Other 65    Cancer Other         (prostate?)     Social History     Tobacco Use    Smoking status: Never    Smokeless tobacco: Never   Substance and Sexual Activity    Alcohol use: Yes     Comment: ocassional    Drug use: No    Sexual activity: Not on file        ROS:   Review of Systems   Constitutional:  Negative for fever, malaise/fatigue and weight loss.   HENT:  Negative for congestion, hearing loss, nosebleeds and sore throat.    Eyes:  Negative for double vision and photophobia.   Respiratory:  Negative for cough, hemoptysis, sputum production, shortness of breath and wheezing.    Cardiovascular:  Negative for chest pain, palpitations, orthopnea and leg swelling.   Gastrointestinal:  Negative for abdominal pain, blood in stool, constipation, diarrhea, heartburn, nausea and vomiting.   Genitourinary:  Negative for dysuria, hematuria and urgency.   Musculoskeletal:  Negative for back pain, joint pain and myalgias.   Skin:  Negative for itching and rash.   Neurological:  Positive for tingling. Negative for dizziness, speech change, seizures, weakness and headaches.   Endo/Heme/Allergies:  Negative for polydipsia. Does not bruise/bleed easily.   Psychiatric/Behavioral:  Negative for depression and memory loss. The patient is not nervous/anxious and does not have insomnia.         Objective:     Vitals:    09/29/23 0828   BP: 118/66   Pulse: 81   Resp: 18   Temp: 97.9 °F (36.6 °C)   TempSrc: Oral   SpO2: 100%   Weight: 81.7 kg (180 lb 0.1 oz)   Height: 5' 2" (1.575 m)   PainSc: 0-No pain     Wt Readings from Last 10 Encounters:   09/29/23 81.7 kg (180 lb 0.1 oz)   03/17/23 80 kg (176 lb 5.9 oz)   08/26/22 80.4 kg (177 lb 5.8 oz)   08/10/22 80.7 kg (177 lb 14.4 oz)   03/30/22 81.5 kg (179 lb 10.8 oz)   09/23/21 84.2 kg (185 lb 10 oz)   04/09/21 84.4 kg (186 lb 1.6 oz) "   03/31/21 85.6 kg (188 lb 11.4 oz)   12/05/17 77.1 kg (170 lb)   12/01/17 77.1 kg (170 lb)       Physical Examination:   Physical Exam  Vitals and nursing note reviewed.   Constitutional:       General: She is not in acute distress.     Appearance: She is not diaphoretic.   HENT:      Head: Normocephalic.      Mouth/Throat:      Pharynx: No oropharyngeal exudate.   Eyes:      General: No scleral icterus.     Conjunctiva/sclera: Conjunctivae normal.   Neck:      Thyroid: No thyromegaly.   Cardiovascular:      Rate and Rhythm: Normal rate and regular rhythm.      Heart sounds: Normal heart sounds. No murmur heard.  Pulmonary:      Effort: Pulmonary effort is normal. No respiratory distress.      Breath sounds: No stridor. No wheezing or rales.   Chest:      Chest wall: No tenderness.   Abdominal:      General: Bowel sounds are normal. There is no distension.      Palpations: Abdomen is soft. There is no mass.      Tenderness: There is no abdominal tenderness. There is no rebound.   Musculoskeletal:         General: No tenderness or deformity. Normal range of motion.      Cervical back: Neck supple.   Lymphadenopathy:      Cervical: No cervical adenopathy.   Skin:     General: Skin is warm and dry.      Findings: No erythema or rash.      Comments: No breast mass, skin or nipple changes or axillary adenopathy bilaterally. Stable left breast post op changes.    Neurological:      Mental Status: She is alert and oriented to person, place, and time.      Cranial Nerves: No cranial nerve deficit.      Coordination: Coordination normal.      Gait: Gait is intact.   Psychiatric:         Mood and Affect: Affect normal.         Cognition and Memory: Memory normal.         Judgment: Judgment normal.          Diagnostic Tests:  Significant Imaging: I have reviewed and interpreted all pertinent imaging results/findings.    Laboratory Data:  All pertinent labs have been reviewed.  Labs:   Lab Results   Component Value Date    WBC  3.01 (L) 09/29/2023    RBC 4.63 09/29/2023    HGB 12.7 09/29/2023    HCT 39.3 09/29/2023    MCV 85 09/29/2023     09/29/2023    GLU 94 09/29/2023     09/29/2023    K 3.9 09/29/2023    BUN 8 09/29/2023    CREATININE 0.8 09/29/2023    AST 19 09/29/2023    ALT 19 09/29/2023    BILITOT 0.7 09/29/2023       Assessment/Plan:   Malignant neoplasm of central portion of left breast in female, estrogen receptor negative  Grade 3, ER negative, CA 3% positive, HER2/ian not amplified,[id number], clinical prognostic stage IIIA     Diagnosed in April 2018, status post lumpectomy, adjuvant chemotherapy with dose dense AC +paclitaxel x8 of 12 due to peripheral neuropathy.  She underwent adjuvant radiation therapy.     Clinically, she was triple negative with very low CA positivity and endocrine therapy was deferred and that was her preference as well.  BRCA testing negative at diagnosis.     Continue active surveillance.   Mammogram is normal in June at [facility] [place], result reviewed,  repeat in one year. Discussed the recommendations for screening MRI for patients with dense breasts, will start ordering here at [facility] [place] and will alternate 6 month mammogram and MRI.     She is up-to-date with her colonoscopy, Pap smear.   I will see her back in six months as long as she is stable.      Neutropenia   Work up including smear review, Hep and HIV panel, THERESE, RF, B12 and folate are all normal. Cbc has been in the low 3 - mid 2 range for the past 2 years. Likely benign neutropenia, stable, continue to monitor.     Peripheral neuropathy due to chemotherapy  Continue gabapentin as needed.    Referred to [facility].     Pre diabetes  Fasting glucose is normal today.   Continue diet and exercise and PMD follow up.      ECOG SCORE    0 - Fully active-able to carry on all pre-disease performance without restriction           Discussion:   No follow-ups on file.    Plan was discussed with the patient at length, and she verbalized  understanding. Saadia was given an opportunity to ask questions that were answered to her satisfaction, and she was advised to call in the interval if any problems or questions arise.    Electronically signed by Lisha Damico MD      Route Chart for Scheduling    Med Onc Chart Routing      Follow up with physician 6 months. Friday appt   Follow up with MARILY    Infusion scheduling note    Injection scheduling note    Labs CBC and CMP   Scheduling:  Preferred lab:  Lab interval:     Imaging MRI   Mid December 2023   Pharmacy appointment    Other referrals

## 2023-10-31 ENCOUNTER — TELEPHONE (OUTPATIENT)
Dept: HEMATOLOGY/ONCOLOGY | Facility: CLINIC | Age: 58
End: 2023-10-31
Payer: COMMERCIAL

## 2023-10-31 NOTE — TELEPHONE ENCOUNTER
Spoke with pt. in regards to confirming upcoming appointment on Thursday, November 2nd with Kim Jorge PA-C. Pt. was unsure if there were classes scheduled for Thursday and advised MA that she would call back tomorrow. MA acknowledged.     BLD, MA Ext. [phone]

## 2024-01-12 ENCOUNTER — HOSPITAL ENCOUNTER (OUTPATIENT)
Dept: RADIOLOGY | Facility: HOSPITAL | Age: 59
Discharge: HOME | End: 2024-01-12
Attending: INTERNAL MEDICINE
Payer: COMMERCIAL

## 2024-01-12 DIAGNOSIS — C50.112 MALIGNANT NEOPLASM OF CENTRAL PORTION OF LEFT BREAST IN FEMALE, ESTROGEN RECEPTOR NEGATIVE: ICD-10-CM

## 2024-01-12 DIAGNOSIS — Z17.1 MALIGNANT NEOPLASM OF CENTRAL PORTION OF LEFT BREAST IN FEMALE, ESTROGEN RECEPTOR NEGATIVE: ICD-10-CM

## 2024-01-12 PROCEDURE — 77049 MRI BREAST C-+ W/CAD BI: CPT | Mod: 26,,, | Performed by: RADIOLOGY

## 2024-01-12 PROCEDURE — 25500020 PHARM REV CODE 255: Performed by: INTERNAL MEDICINE

## 2024-01-12 PROCEDURE — A9577 INJ MULTIHANCE: HCPCS | Performed by: INTERNAL MEDICINE

## 2024-01-12 PROCEDURE — 77049 MRI BREAST C-+ W/CAD BI: CPT | Mod: TC

## 2024-01-12 RX ADMIN — GADOBENATE DIMEGLUMINE 18 ML: 529 INJECTION, SOLUTION INTRAVENOUS at 02:01

## 2024-03-07 ENCOUNTER — TELEPHONE (OUTPATIENT)
Dept: HEMATOLOGY/ONCOLOGY | Facility: CLINIC | Age: 59
End: 2024-03-07
Payer: COMMERCIAL

## 2024-03-07 NOTE — TELEPHONE ENCOUNTER
----- Message from Terence Jiménez sent at 3/7/2024 10:50 AM CST -----  Type:  Patient Returning Call    Who Called:PT  Who Left Message for Patient:  Does the patient know what this is regarding?:f/u appt  Would the patient rather a call back or a response via [facility]? Call  Best Call Back Number: [phone]  Additional Information: pt states she would like a call from office to set up f/u appt. Thank you

## 2024-04-15 ENCOUNTER — LAB VISIT (OUTPATIENT)
Dept: LAB | Facility: HOSPITAL | Age: 59
End: 2024-04-15
Attending: INTERNAL MEDICINE
Payer: COMMERCIAL

## 2024-04-15 ENCOUNTER — OFFICE VISIT (OUTPATIENT)
Dept: HEMATOLOGY/ONCOLOGY | Facility: CLINIC | Age: 59
End: 2024-04-15
Payer: COMMERCIAL

## 2024-04-15 VITALS
DIASTOLIC BLOOD PRESSURE: 58 MMHG | OXYGEN SATURATION: 97 % | HEIGHT: 62 IN | BODY MASS INDEX: 34.85 KG/M2 | RESPIRATION RATE: 20 BRPM | SYSTOLIC BLOOD PRESSURE: 110 MMHG | WEIGHT: 189.38 LBS | HEART RATE: 95 BPM

## 2024-04-15 DIAGNOSIS — Z17.1 MALIGNANT NEOPLASM OF CENTRAL PORTION OF LEFT BREAST IN FEMALE, ESTROGEN RECEPTOR NEGATIVE: ICD-10-CM

## 2024-04-15 DIAGNOSIS — Z17.1 MALIGNANT NEOPLASM OF CENTRAL PORTION OF LEFT BREAST IN FEMALE, ESTROGEN RECEPTOR NEGATIVE: Primary | ICD-10-CM

## 2024-04-15 DIAGNOSIS — C50.112 MALIGNANT NEOPLASM OF CENTRAL PORTION OF LEFT BREAST IN FEMALE, ESTROGEN RECEPTOR NEGATIVE: Primary | ICD-10-CM

## 2024-04-15 DIAGNOSIS — C50.112 MALIGNANT NEOPLASM OF CENTRAL PORTION OF LEFT BREAST IN FEMALE, ESTROGEN RECEPTOR NEGATIVE: ICD-10-CM

## 2024-04-15 DIAGNOSIS — Z71.83 ENCOUNTER FOR NONPROCREATIVE GENETIC COUNSELING: ICD-10-CM

## 2024-04-15 LAB
ALBUMIN SERPL BCP-MCNC: 3.8 G/DL (ref 3.5–5.2)
ALP SERPL-CCNC: 71 U/L (ref 55–135)
ALT SERPL W/O P-5'-P-CCNC: 18 U/L (ref 10–44)
ANION GAP SERPL CALC-SCNC: 8 MMOL/L (ref 8–16)
AST SERPL-CCNC: 20 U/L (ref 10–40)
BASOPHILS # BLD AUTO: 0.01 K/UL (ref 0–0.2)
BASOPHILS NFR BLD: 0.3 % (ref 0–1.9)
BILIRUB SERPL-MCNC: 0.6 MG/DL (ref 0.1–1)
BUN SERPL-MCNC: 10 MG/DL (ref 6–20)
CALCIUM SERPL-MCNC: 9.6 MG/DL (ref 8.7–10.5)
CHLORIDE SERPL-SCNC: 105 MMOL/L (ref 95–110)
CO2 SERPL-SCNC: 26 MMOL/L (ref 23–29)
CREAT SERPL-MCNC: 0.8 MG/DL (ref 0.5–1.4)
DIFFERENTIAL METHOD BLD: ABNORMAL
EOSINOPHIL # BLD AUTO: 0.1 K/UL (ref 0–0.5)
EOSINOPHIL NFR BLD: 1.6 % (ref 0–8)
ERYTHROCYTE [DISTWIDTH] IN BLOOD BY AUTOMATED COUNT: 13.8 % (ref 11.5–14.5)
EST. GFR  (NO RACE VARIABLE): >60 ML/MIN/1.73 M^2
GLUCOSE SERPL-MCNC: 106 MG/DL (ref 70–110)
HCT VFR BLD AUTO: 39.1 % (ref 37–48.5)
HGB BLD-MCNC: 12.5 G/DL (ref 12–16)
IMM GRANULOCYTES # BLD AUTO: 0.01 K/UL (ref 0–0.04)
IMM GRANULOCYTES NFR BLD AUTO: 0.3 % (ref 0–0.5)
LYMPHOCYTES # BLD AUTO: 1 K/UL (ref 1–4.8)
LYMPHOCYTES NFR BLD: 32.7 % (ref 18–48)
MCH RBC QN AUTO: 26.7 PG (ref 27–31)
MCHC RBC AUTO-ENTMCNC: 32 G/DL (ref 32–36)
MCV RBC AUTO: 83 FL (ref 82–98)
MONOCYTES # BLD AUTO: 0.3 K/UL (ref 0.3–1)
MONOCYTES NFR BLD: 9 % (ref 4–15)
NEUTROPHILS # BLD AUTO: 1.8 K/UL (ref 1.8–7.7)
NEUTROPHILS NFR BLD: 56.1 % (ref 38–73)
NRBC BLD-RTO: 0 /100 WBC
PLATELET # BLD AUTO: 194 K/UL (ref 150–450)
PMV BLD AUTO: 10 FL (ref 9.2–12.9)
POTASSIUM SERPL-SCNC: 3.8 MMOL/L (ref 3.5–5.1)
PROT SERPL-MCNC: 7.1 G/DL (ref 6–8.4)
RBC # BLD AUTO: 4.69 M/UL (ref 4–5.4)
SODIUM SERPL-SCNC: 139 MMOL/L (ref 136–145)
WBC # BLD AUTO: 3.12 K/UL (ref 3.9–12.7)

## 2024-04-15 PROCEDURE — 80053 COMPREHEN METABOLIC PANEL: CPT | Performed by: INTERNAL MEDICINE

## 2024-04-15 PROCEDURE — 3008F BODY MASS INDEX DOCD: CPT | Mod: CPTII,S$GLB,, | Performed by: INTERNAL MEDICINE

## 2024-04-15 PROCEDURE — 99214 OFFICE O/P EST MOD 30 MIN: CPT | Mod: S$GLB,,, | Performed by: INTERNAL MEDICINE

## 2024-04-15 PROCEDURE — 1159F MED LIST DOCD IN RCRD: CPT | Mod: CPTII,S$GLB,, | Performed by: INTERNAL MEDICINE

## 2024-04-15 PROCEDURE — 85025 COMPLETE CBC W/AUTO DIFF WBC: CPT | Performed by: INTERNAL MEDICINE

## 2024-04-15 PROCEDURE — 36415 COLL VENOUS BLD VENIPUNCTURE: CPT | Performed by: INTERNAL MEDICINE

## 2024-04-15 PROCEDURE — 3074F SYST BP LT 130 MM HG: CPT | Mod: CPTII,S$GLB,, | Performed by: INTERNAL MEDICINE

## 2024-04-15 PROCEDURE — 3078F DIAST BP <80 MM HG: CPT | Mod: CPTII,S$GLB,, | Performed by: INTERNAL MEDICINE

## 2024-04-15 PROCEDURE — 99999 PR PBB SHADOW E&M-EST. PATIENT-LVL IV: CPT | Mod: PBBFAC,,, | Performed by: INTERNAL MEDICINE

## 2024-04-15 NOTE — PROGRESS NOTES
[facility]/ [facility]   at [facility] Note:      Chief Complaint:   Encounter Diagnosis   Name Primary?    Malignant neoplasm of central portion of left breast in female, estrogen receptor negative Yes        Cancer Staging   Malignant neoplasm of central portion of left breast in female, estrogen receptor negative  Staging form: Breast, AJCC 8th Edition  - Clinical stage from 4/30/2018: Stage IIIA (cT2, cN1mi(sn), cM0, G3, ER-, IN+, HER2-) - Signed by Lisha Damico MD on 3/31/2021      HPI:  Saadia Cancino is a 58 y.o. female who presents today for follow up for breast cancer. Patient previously seen by Dr Damico. She presents today for routine follow up    Oncology history   Patient initially seen by Dr Damico.   Diagnosed with L breast IDC April 2018    S/p lumpectomy with SLNBx: IDC, grade 3; 1 LN with mi   ER 0/ IN 3%/ HER2 negative  Stage IIB     Residual neuropathy on gabapentin     Social History     Tobacco Use    Smoking status: Never    Smokeless tobacco: Never   Substance Use Topics    Alcohol use: Yes     Comment: ocassional    Drug use: No     Family History   Problem Relation Name Age of Onset    Colon polyps Father Uriostegui 79        pt believes (just a couple)    Cancer Other [facility]         origin?    Cancer Other Pat         origin?    Cancer Paternal Uncle Hiro         pt believes (prostate?); does not believe was COD    Breast cancer Other [address]    Cancer Other Michael         (prostate?)     Past Medical History:   Diagnosis Date    Breast lump     benign, while patient was in high school (right breast?)     Past Surgical History:   Procedure Laterality Date    cyst removed Right        Patient Active Problem List   Diagnosis    Lumbar sprain    Malignant neoplasm of central portion of left breast in female, estrogen receptor negative    Peripheral neuropathy due to chemotherapy    Lymphopenia    Menopausal state    Class 1  "obesity due to excess calories without serious comorbidity with body mass index (BMI) of 32.0 to 32.9 in adult    Neutropenia    Pre-diabetes       Current Outpatient Medications   Medication Instructions    ALPRAZolam (XANAX) 0.5 mg, Oral, On Call Procedure    gabapentin (NEURONTIN) 300 mg, Oral, 3 times daily    meloxicam (MOBIC) 15 mg, Oral, Daily    naproxen (NAPROSYN) 500 mg, Oral, 2 times daily PRN    tiZANidine (ZANAFLEX) 4 mg, Oral, Nightly PRN       Review of Systems:   Review of Systems   Constitutional: Negative.    HENT: Negative.     Respiratory: Negative.     Cardiovascular: Negative.    Gastrointestinal: Negative.    Musculoskeletal: Negative.    All other systems reviewed and are negative.      PHYSICAL EXAM:  BP (!) 110/58   Pulse 95   Resp 20   Ht 5' 2" (1.575 m)   Wt 85.9 kg (189 lb 6 oz)   LMP  (LMP Unknown)   SpO2 97%   BMI 34.64 kg/m²     General Appearance:    Alert, cooperative, no distress, appears stated age   Head:    Normocephalic, without obvious abnormality, atraumatic   Throat:   Lips, mucosa, and tongue normal; teeth and gums normal   Neck:   Supple, symmetrical, no adenopathy;     thyroid:  no enlargement/tenderness/nodules   Lungs:     Clear to auscultation bilaterally, respirations unlabored    Heart:    Regular rate and rhythm, S1 and S2 normal   Breast Exam:    S/p left lumpectomy, no  mass or nodularity. Right breast without mass, nodule or skin changes. Nipple without inversion. No axillary or supraclavicular adenopathy.   Abdomen:     Soft, non-tender, bowel sounds active, no masses, no organomegaly   Extremities:   Extremities normal, atraumatic, no cyanosis or edema   Pulses:   2+ and symmetric all extremities   Skin:   Skin color, texture, turgor normal, no rashes or lesions   Lymph nodes:   Cervical, supraclavicular, and axillary nodes normal   Neurologic:   CNII-XII intact, normal gait           Pertinent Labs & Imaging:  Pathology Results  (Last 30 days)      None "            Recent Results (from the past 24 hour(s))   CMP    Collection Time: 04/15/24  2:32 PM   Result Value Ref Range    Sodium 139 136 - 145 mmol/L    Potassium 3.8 3.5 - 5.1 mmol/L    Chloride 105 95 - 110 mmol/L    CO2 26 23 - 29 mmol/L    Glucose 106 70 - 110 mg/dL    Calcium 9.6 8.7 - 10.5 mg/dL    Total Protein 7.1 6.0 - 8.4 g/dL    Albumin 3.8 3.5 - 5.2 g/dL    Total Bilirubin 0.6 0.1 - 1.0 mg/dL    Anion Gap 8 8 - 16 mmol/L   CBC Auto Differential    Collection Time: 04/15/24  2:32 PM   Result Value Ref Range    WBC 3.12 (L) 3.90 - 12.70 K/uL    RBC 4.69 4.00 - 5.40 M/uL    Hemoglobin 12.5 12.0 - 16.0 g/dL    Hematocrit 39.1 37.0 - 48.5 %    MCV 83 82 - 98 fL    MCH 26.7 (L) 27.0 - 31.0 pg    MCHC 32.0 32.0 - 36.0 g/dL    RDW 13.8 11.5 - 14.5 %    Platelets 194 150 - 450 K/uL    MPV 10.0 9.2 - 12.9 fL    Immature Granulocytes 0.3 0.0 - 0.5 %    Gran # (ANC) 1.8 1.8 - 7.7 K/uL    Immature Grans (Abs) 0.01 0.00 - 0.04 K/uL    Lymph # 1.0 1.0 - 4.8 K/uL    Mono # 0.3 0.3 - 1.0 K/uL    Eos # 0.1 0.0 - 0.5 K/uL    Baso # 0.01 0.00 - 0.20 K/uL    nRBC 0 0 /100 WBC    Gran % 56.1 38.0 - 73.0 %    Lymph % 32.7 18.0 - 48.0 %    Mono % 9.0 4.0 - 15.0 %    Eosinophil % 1.6 0.0 - 8.0 %    Basophil % 0.3 0.0 - 1.9 %    Differential Method Automated        Assessment & Plan:    1. Malignant neoplasm of central portion of left breast in female, estrogen receptor negative  - Mammo Digital Screening Bilat; Future  - Mammo Digital Screening Bilat      Reviewed patients referring notes, imaging and pathology. Discussed diagnosis, staging, and treatment in detail with patient.   Patient with Stage II TNBC in 2018 s/p lumpectomy and chemotherapy   She is now >5 yrs from diagnosis and surgery  Will plan to transition to annual follow ups   Mammogram June 2023; repeat June 2024    Per NCCN Guidelines, breast cancer patients should be followed every 3-12 months for the first five years and then annually thereafter with annual  mammography if mastectomy or breast reconstruction was not undertaken. At this time, there is no indication for routine laboratory or imaging in the surveillance for metastatic disease, unless clinical signs or symptoms arise.    Healthy diet, low alcohol intake, and an active lifestyle, with a goal of an ideal BMI (20-25) is also encouraged to reduce the risk of breast cancer occurrences and recurrences, as well as improve side effects to anti-estrogen medications      Route Chart for Scheduling    Med Onc Chart Routing      Follow up with physician 1 year.   Follow up with MARILY    Infusion scheduling note    Injection scheduling note    Labs    Imaging    Pharmacy appointment    Other referrals                       Total time of this visit, including time spent face to face with patient and/or via video/audio, and also in preparing for today's visit for MDM and documentation. (Medical Decision Making, including consideration of possible diagnoses, management options, complex medical record review, review of diagnostic tests and information, consideration and discussion of significant complications based on comorbidities, and discussion with providers involved with the care of the patient) 30 minutes. Greater than 50% was spent face to face with the patient counseling and coordinating care.      Devika Raya MD   04/15/2024

## 2024-06-05 DIAGNOSIS — R73.03 PREDIABETES: ICD-10-CM

## 2024-06-25 ENCOUNTER — OFFICE VISIT (OUTPATIENT)
Dept: FAMILY MEDICINE | Facility: CLINIC | Age: 59
End: 2024-06-25
Payer: COMMERCIAL

## 2024-06-25 VITALS
HEART RATE: 92 BPM | BODY MASS INDEX: 34.91 KG/M2 | DIASTOLIC BLOOD PRESSURE: 78 MMHG | SYSTOLIC BLOOD PRESSURE: 110 MMHG | HEIGHT: 62 IN | OXYGEN SATURATION: 98 % | WEIGHT: 189.69 LBS

## 2024-06-25 DIAGNOSIS — T45.1X5A PERIPHERAL NEUROPATHY DUE TO CHEMOTHERAPY: ICD-10-CM

## 2024-06-25 DIAGNOSIS — Z00.00 ANNUAL PHYSICAL EXAM: Primary | ICD-10-CM

## 2024-06-25 DIAGNOSIS — Z85.3 HISTORY OF BREAST CANCER: ICD-10-CM

## 2024-06-25 DIAGNOSIS — R73.03 PRE-DIABETES: Chronic | ICD-10-CM

## 2024-06-25 DIAGNOSIS — G62.0 PERIPHERAL NEUROPATHY DUE TO CHEMOTHERAPY: ICD-10-CM

## 2024-06-25 DIAGNOSIS — R53.83 FATIGUE, UNSPECIFIED TYPE: ICD-10-CM

## 2024-06-25 DIAGNOSIS — E66.09 CLASS 1 OBESITY DUE TO EXCESS CALORIES WITHOUT SERIOUS COMORBIDITY WITH BODY MASS INDEX (BMI) OF 34.0 TO 34.9 IN ADULT: ICD-10-CM

## 2024-06-25 PROBLEM — D70.9 NEUTROPENIA: Status: RESOLVED | Noted: 2022-08-26 | Resolved: 2024-06-25

## 2024-06-25 PROBLEM — E66.811 CLASS 1 OBESITY DUE TO EXCESS CALORIES WITHOUT SERIOUS COMORBIDITY WITH BODY MASS INDEX (BMI) OF 34.0 TO 34.9 IN ADULT: Chronic | Status: ACTIVE | Noted: 2022-08-10

## 2024-06-25 PROCEDURE — 3074F SYST BP LT 130 MM HG: CPT | Mod: CPTII,S$GLB,, | Performed by: FAMILY MEDICINE

## 2024-06-25 PROCEDURE — 3008F BODY MASS INDEX DOCD: CPT | Mod: CPTII,S$GLB,, | Performed by: FAMILY MEDICINE

## 2024-06-25 PROCEDURE — 90715 TDAP VACCINE 7 YRS/> IM: CPT | Mod: S$GLB,,, | Performed by: FAMILY MEDICINE

## 2024-06-25 PROCEDURE — 1159F MED LIST DOCD IN RCRD: CPT | Mod: CPTII,S$GLB,, | Performed by: FAMILY MEDICINE

## 2024-06-25 PROCEDURE — 90471 IMMUNIZATION ADMIN: CPT | Mod: S$GLB,,, | Performed by: FAMILY MEDICINE

## 2024-06-25 PROCEDURE — 1160F RVW MEDS BY RX/DR IN RCRD: CPT | Mod: CPTII,S$GLB,, | Performed by: FAMILY MEDICINE

## 2024-06-25 PROCEDURE — 99396 PREV VISIT EST AGE 40-64: CPT | Mod: 25,S$GLB,, | Performed by: FAMILY MEDICINE

## 2024-06-25 PROCEDURE — 99999 PR PBB SHADOW E&M-EST. PATIENT-LVL III: CPT | Mod: PBBFAC,,, | Performed by: FAMILY MEDICINE

## 2024-06-25 PROCEDURE — 3078F DIAST BP <80 MM HG: CPT | Mod: CPTII,S$GLB,, | Performed by: FAMILY MEDICINE

## 2024-06-25 RX ORDER — MELOXICAM 15 MG/1
15 TABLET ORAL DAILY
Qty: 90 TABLET | Refills: 3 | Status: SHIPPED | OUTPATIENT
Start: 2024-06-25

## 2024-06-25 RX ORDER — GABAPENTIN 300 MG/1
300 CAPSULE ORAL 3 TIMES DAILY
Qty: 90 CAPSULE | Refills: 3 | Status: SHIPPED | OUTPATIENT
Start: 2024-06-25 | End: 2024-10-23

## 2024-06-25 NOTE — PROGRESS NOTES
"PATIENT VISIT FAMILY MEDICINE    CC:   Chief Complaint   Patient presents with    Annual Exam       HPI: Saadia Cancino  is a 58 y.o. female:    Patient is known to me.  Patient presents for annual    Sluggish for about 3 weeks. Energy levels fluctuate. Have been doing a diet low carb. 15-30min walking. Has been doing this around 2 months. Feels she sleeps well. Denies depression/anxiety. No bowel/bladder changes. Cancer screenings up to date.       PMHX:   Past Medical History:   Diagnosis Date    Breast lump     benign, while patient was in high school (right breast?)       PSHX:   Past Surgical History:   Procedure Laterality Date    cyst removed Right        FAMHX:   Family History   Problem Relation Name Age of Onset    Colon polyps Father Uriostegui 79        pt believes (just a couple)    Cancer Other [facility]         origin?    Cancer Other [facility]         origin?    Cancer Paternal Uncle Hiro         pt believes (prostate?); does not believe was COD    Breast cancer Other [address]    Cancer Other Michael         (prostate?)       SOCHX:   Social History     Socioeconomic History    Marital status:    Tobacco Use    Smoking status: Never    Smokeless tobacco: Never   Substance and Sexual Activity    Alcohol use: Yes     Comment: ocassional    Drug use: No       ALLERGIES:   Review of patient's allergies indicates:   Allergen Reactions    Codeine Itching    Hydrocodone Itching       MEDS:   Current Outpatient Medications:     gabapentin (NEURONTIN) 300 MG capsule, Take 1 capsule (300 mg total) by mouth 3 (three) times daily., Disp: 90 capsule, Rfl: 3    meloxicam (MOBIC) 15 MG tablet, Take 1 tablet (15 mg total) by mouth once daily., Disp: 90 tablet, Rfl: 3    OBJECTIVE:   Vitals:    06/25/24 0938   BP: 110/78   BP Location: Left arm   Patient Position: Sitting   BP Method: Large (Manual)   Pulse: 92   SpO2: 98%   Weight: 86 kg (189 lb 11.3 oz)   Height: 5' 2" (1.575 m)     Body mass index is 34.7 " kg/m².      Physical Exam  Vitals and nursing note reviewed.   Constitutional:       Appearance: Normal appearance.   HENT:      Head: Normocephalic.   Eyes:      General:         Right eye: No discharge.         Left eye: No discharge.      Extraocular Movements: Extraocular movements intact.   Cardiovascular:      Rate and Rhythm: Normal rate and regular rhythm.      Heart sounds: Normal heart sounds.   Pulmonary:      Effort: Pulmonary effort is normal.      Breath sounds: Normal breath sounds.   Abdominal:      General: Abdomen is flat. Bowel sounds are normal. There is no distension.      Palpations: Abdomen is soft. There is no mass.      Tenderness: There is no abdominal tenderness. There is no guarding or rebound.      Hernia: No hernia is present.   Skin:     Comments: No obvious rash on exposed skin   Neurological:      Mental Status: She is alert.   Psychiatric:         Behavior: Behavior normal.           LABS:   A1C:  Recent Labs   Lab 08/10/22  0936   Hemoglobin A1C 5.8 H     CBC:  Recent Labs   Lab 04/15/24  1432   WBC 3.12 L   RBC 4.69   Hemoglobin 12.5   Hematocrit 39.1   Platelets 194   MCV 83   MCH 26.7 L   MCHC 32.0     CMP:  Recent Labs   Lab 04/15/24  1432   Glucose 106   Calcium 9.6   Albumin 3.8   Total Protein 7.1   Sodium 139   Potassium 3.8   CO2 26   Chloride 105   BUN 10   Creatinine 0.8   Alkaline Phosphatase 71   ALT 18   AST 20   Total Bilirubin 0.6     LIPIDS:  Recent Labs   Lab 08/10/22  0936   TSH 1.370   HDL 74   Cholesterol 157   Triglycerides 50   LDL Cholesterol 73.0   HDL/Cholesterol Ratio 47.1   Non-HDL Cholesterol 83   Total Cholesterol/HDL Ratio 2.1     TSH:  Recent Labs   Lab 08/10/22  0936   TSH 1.370         ASSESSMENT & PLAN:    Problem List Items Addressed This Visit          Neuro    Peripheral neuropathy due to chemotherapy    Overview     Stable. Continue current medical therapy. Advised not to take meloxicam with other NSAIDs           Relevant Medications     meloxicam (MOBIC) 15 MG tablet    gabapentin (NEURONTIN) 300 MG capsule       Oncology    History of breast cancer (Chronic)    Overview     S/p chemo/lumpectomy. Mammogram UTD            Endocrine    Class 1 obesity due to excess calories without serious comorbidity with body mass index (BMI) of 34.0 to 34.9 in adult (Chronic)    Overview     Body mass index is 34.7 kg/m².  Continue diet/exercise efforts. Discussed balancing macro nutrients and varying exercise routine.            Pre-diabetes (Chronic)    Overview     Repeat A1c  Continue diet/exercise efforts.           Other Visit Diagnoses       Annual physical exam    -  Preventative cares discussed and updated as needed. Lifestyle modifications discussed as needed.      Relevant Orders    CBC Auto Differential    Comprehensive Metabolic Panel    Hemoglobin A1C    Lipid Panel    Fatigue, unspecified type    unclear etiology. Discussed diet/exercise adjustment. Consider sleep study. Denies mood sx. No red flag s/sx. Cancer screening UTD.     Relevant Orders    TSH    T4, Free              Follow up in about 1 year (around 6/25/2025) for annual.      RTC/ED precautions discussed where applicable.   Encouraged patient to let me know if there are any further questions/concerns.     Advise patient/caretaker to check with insurance regarding orders to avoid unexpected fees/costs.     The patient/caretaker indicates understanding of these issues and agrees with the plan.    Dr. Holland Mcpherson MD  Family Medicine

## 2025-05-02 ENCOUNTER — OFFICE VISIT (OUTPATIENT)
Dept: HEMATOLOGY/ONCOLOGY | Facility: CLINIC | Age: 60
End: 2025-05-02
Payer: COMMERCIAL

## 2025-05-02 VITALS
WEIGHT: 186.5 LBS | HEART RATE: 86 BPM | TEMPERATURE: 98 F | SYSTOLIC BLOOD PRESSURE: 114 MMHG | BODY MASS INDEX: 34.32 KG/M2 | OXYGEN SATURATION: 98 % | DIASTOLIC BLOOD PRESSURE: 80 MMHG | HEIGHT: 62 IN

## 2025-05-02 DIAGNOSIS — C50.112 MALIGNANT NEOPLASM OF CENTRAL PORTION OF LEFT BREAST IN FEMALE, ESTROGEN RECEPTOR NEGATIVE: Primary | ICD-10-CM

## 2025-05-02 DIAGNOSIS — Z17.1 MALIGNANT NEOPLASM OF CENTRAL PORTION OF LEFT BREAST IN FEMALE, ESTROGEN RECEPTOR NEGATIVE: Primary | ICD-10-CM

## 2025-05-02 PROCEDURE — 99999 PR PBB SHADOW E&M-EST. PATIENT-LVL III: CPT | Mod: PBBFAC,,, | Performed by: INTERNAL MEDICINE

## 2025-05-02 NOTE — PROGRESS NOTES
[facility]/ [facility]   at [facility] Note:      Chief Complaint:   Encounter Diagnosis   Name Primary?    Malignant neoplasm of central portion of left breast in female, estrogen receptor negative Yes        Cancer Staging   Malignant neoplasm of central portion of left breast in female, estrogen receptor negative  Staging form: Breast, AJCC 8th Edition  - Clinical stage from 4/30/2018: Stage IIIA (cT2, cN1mi(sn), cM0, G3, ER-, SC+, HER2-) - Signed by Lisha Damico MD on 3/31/2021      HPI:  Saadia Cancino is a 59 y.o. female who presents today for follow up for breast cancer. She presents today for routine follow up    Oncology history   Patient initially seen by Dr Damico.   Diagnosed with L breast IDC April 2018    S/p lumpectomy with SLNBx: IDC, grade 3; 1 LN with mi   ER 0/ SC 3%/ HER2 negative  Stage IIB     Residual neuropathy on gabapentin     Social History     Tobacco Use    Smoking status: Never    Smokeless tobacco: Never   Substance Use Topics    Alcohol use: Yes     Comment: ocassional    Drug use: No     Family History   Problem Relation Name Age of Onset    Colon polyps Father Uriostegui 79        pt believes (just a couple)    Cancer Other [facility]         origin?    Cancer Other [facility]         origin?    Cancer Paternal Uncle Hiro         pt believes (prostate?); does not believe was COD    Breast cancer Other [address]    Cancer Other [facility]         (prostate?)     Past Medical History:   Diagnosis Date    Breast lump     benign, while patient was in high school (right breast?)     Past Surgical History:   Procedure Laterality Date    cyst removed Right        Patient Active Problem List   Diagnosis    Lumbar sprain    Malignant neoplasm of central portion of left breast in female, estrogen receptor negative    Peripheral neuropathy due to chemotherapy    Lymphopenia    Menopausal state    Class 1 obesity due to excess calories without  "serious comorbidity with body mass index (BMI) of 34.0 to 34.9 in adult    Pre-diabetes    History of breast cancer       Current Outpatient Medications   Medication Instructions    gabapentin (NEURONTIN) 300 mg, Oral, 3 times daily    meloxicam (MOBIC) 15 mg, Oral, Daily       Review of Systems:   Review of Systems   Constitutional: Negative.    HENT: Negative.     Respiratory: Negative.     Cardiovascular: Negative.    Gastrointestinal: Negative.    Musculoskeletal: Negative.    All other systems reviewed and are negative.      PHYSICAL EXAM:  /80 (BP Location: Left arm, Patient Position: Sitting)   Pulse 86   Temp 97.8 °F (36.6 °C) (Oral)   Ht 5' 2" (1.575 m)   Wt 84.6 kg (186 lb 8.2 oz)   LMP  (LMP Unknown)   SpO2 98%   BMI 34.11 kg/m²     General Appearance:    Alert, cooperative, no distress, appears stated age   Lungs:     Clear to auscultation bilaterally, respirations unlabored    Heart:    Regular rate and rhythm, S1 and S2 normal   Breast Exam:    S/p left lumpectomy, no  mass or nodularity. Right breast without mass, nodule or skin changes. Nipple without inversion. No axillary or supraclavicular adenopathy.   Abdomen:     Soft, non-tender, bowel sounds active, no masses, no organomegaly   Extremities:   Extremities normal, atraumatic, no cyanosis or edema   Pulses:   2+ and symmetric all extremities   Skin:   Skin color, texture, turgor normal, no rashes or lesions   Lymph nodes:   Cervical, supraclavicular, and axillary nodes normal   Neurologic:   CNII-XII intact, normal gait           Pertinent Labs & Imaging:  Pathology Results  (Last 30 days)      None            No results found for this or any previous visit (from the past 24 hours).      Assessment & Plan:    1. Malignant neoplasm of central portion of left breast in female, estrogen receptor negative  - Mammo Digital Screening Bilat; Future      Reviewed patients referring notes, imaging and pathology. Discussed diagnosis, staging, " and treatment in detail with patient.   Patient with Stage II TNBC in 2018 s/p lumpectomy and chemotherapy   She is now >5 yrs from diagnosis and surgery  Will plan to transition to annual follow ups   Mammogram August 2024; repeat August 2025    Per NCCN Guidelines, breast cancer patients should be followed every 3-12 months for the first five years and then annually thereafter with annual mammography if mastectomy or breast reconstruction was not undertaken. At this time, there is no indication for routine laboratory or imaging in the surveillance for metastatic disease, unless clinical signs or symptoms arise.    Healthy diet, low alcohol intake, and an active lifestyle, with a goal of an ideal BMI (20-25) is also encouraged to reduce the risk of breast cancer occurrences and recurrences, as well as improve side effects to anti-estrogen medications      Route Chart for Scheduling    Med Onc Chart Routing      Follow up with physician 1 year.   Follow up with MARILY    Infusion scheduling note    Injection scheduling note    Labs    Imaging Mammogram   august   Pharmacy appointment    Other referrals                       Total time of this visit, including time spent face to face with patient and/or via video/audio, and also in preparing for today's visit for MDM and documentation. (Medical Decision Making, including consideration of possible diagnoses, management options, complex medical record review, review of diagnostic tests and information, consideration and discussion of significant complications based on comorbidities, and discussion with providers involved with the care of the patient) 30 minutes. Greater than 50% was spent face to face with the patient counseling and coordinating care.      Devika Raya MD   05/02/2025

## 2025-06-25 ENCOUNTER — OFFICE VISIT (OUTPATIENT)
Dept: FAMILY MEDICINE | Facility: CLINIC | Age: 60
End: 2025-06-25
Payer: COMMERCIAL

## 2025-06-25 VITALS
BODY MASS INDEX: 35.14 KG/M2 | HEIGHT: 62 IN | OXYGEN SATURATION: 96 % | DIASTOLIC BLOOD PRESSURE: 80 MMHG | WEIGHT: 190.94 LBS | SYSTOLIC BLOOD PRESSURE: 119 MMHG | HEART RATE: 104 BPM

## 2025-06-25 DIAGNOSIS — E66.09 CLASS 1 OBESITY DUE TO EXCESS CALORIES WITHOUT SERIOUS COMORBIDITY WITH BODY MASS INDEX (BMI) OF 34.0 TO 34.9 IN ADULT: Chronic | ICD-10-CM

## 2025-06-25 DIAGNOSIS — G62.0 PERIPHERAL NEUROPATHY DUE TO CHEMOTHERAPY: ICD-10-CM

## 2025-06-25 DIAGNOSIS — E66.811 CLASS 1 OBESITY DUE TO EXCESS CALORIES WITHOUT SERIOUS COMORBIDITY WITH BODY MASS INDEX (BMI) OF 34.0 TO 34.9 IN ADULT: Chronic | ICD-10-CM

## 2025-06-25 DIAGNOSIS — R73.03 PRE-DIABETES: Chronic | ICD-10-CM

## 2025-06-25 DIAGNOSIS — Z00.00 ANNUAL PHYSICAL EXAM: Primary | ICD-10-CM

## 2025-06-25 DIAGNOSIS — T45.1X5A PERIPHERAL NEUROPATHY DUE TO CHEMOTHERAPY: ICD-10-CM

## 2025-06-25 DIAGNOSIS — Z85.3 HISTORY OF BREAST CANCER: Chronic | ICD-10-CM

## 2025-06-25 PROCEDURE — 1159F MED LIST DOCD IN RCRD: CPT | Mod: CPTII,S$GLB,, | Performed by: FAMILY MEDICINE

## 2025-06-25 PROCEDURE — 99999 PR PBB SHADOW E&M-EST. PATIENT-LVL III: CPT | Mod: PBBFAC,,, | Performed by: FAMILY MEDICINE

## 2025-06-25 PROCEDURE — 3079F DIAST BP 80-89 MM HG: CPT | Mod: CPTII,S$GLB,, | Performed by: FAMILY MEDICINE

## 2025-06-25 PROCEDURE — 3074F SYST BP LT 130 MM HG: CPT | Mod: CPTII,S$GLB,, | Performed by: FAMILY MEDICINE

## 2025-06-25 PROCEDURE — 3008F BODY MASS INDEX DOCD: CPT | Mod: CPTII,S$GLB,, | Performed by: FAMILY MEDICINE

## 2025-06-25 PROCEDURE — 99396 PREV VISIT EST AGE 40-64: CPT | Mod: 25,S$GLB,, | Performed by: FAMILY MEDICINE

## 2025-06-25 PROCEDURE — 1160F RVW MEDS BY RX/DR IN RCRD: CPT | Mod: CPTII,S$GLB,, | Performed by: FAMILY MEDICINE

## 2025-06-25 RX ORDER — GABAPENTIN 300 MG/1
300 CAPSULE ORAL 3 TIMES DAILY
Qty: 90 CAPSULE | Refills: 3 | Status: SHIPPED | OUTPATIENT
Start: 2025-06-25 | End: 2025-10-23

## 2025-06-25 RX ORDER — MELOXICAM 15 MG/1
15 TABLET ORAL DAILY
Qty: 90 TABLET | Refills: 3 | Status: SHIPPED | OUTPATIENT
Start: 2025-06-25

## 2025-06-25 NOTE — PROGRESS NOTES
PATIENT VISIT FAMILY MEDICINE    CC:   Chief Complaint   Patient presents with    Annual Exam       HPI:    WEIGHT MANAGEMENT:  She reports significant weight gain, currently at 190 lbs, which is the heaviest she has ever been, including during pregnancy. She attributes the weight gain to menopause and finds it challenging. She expresses interest in addressing weight through strength training, understanding that muscle development may not immediately reflect on the scale but can lead to a leaner physique.    DIET AND EXERCISE:  Her diet consists primarily of grilled chicken and salads, consuming salads approximately 80% of the week. She avoids fried foods, sweets, sodas, and bread. Her salads typically include red peppers, green peppers, cucumbers, and homemade salad dressing. She drinks homemade lemonade and aims to consume four bottles of water daily. She recently started using oat milk as an alternative milk option. She currently walks approximately 15 minutes most days with a coworker around 10:00 AM. She recently started incorporating resistance band exercises in the evenings, performing about 25 repetitions until her arms fatigue. She has also begun taking stairs at work, climbing from the seventh-floor parking level, initially challenging but gradually improving, currently doing this two days per week when visiting the office. She demonstrates motivation to increase physical activity and is open to additional strength training recommendations.    FATIGUE:  She reports experiencing extreme exhaustion, describing feeling extremely exhausted at times.    NEUROPATHY:  She reports persistent hand numbness, noting her hands are always numb, which she attributes to neuropathy. She takes Gabapentin as needed for neuropathy.    BACK PAIN:  She currently experiences back pain and takes Meloxicam as needed for management.    RECENT INJURY:  She reports a recent injury involving [place] resulting in a bruise on her leg. She describes the bruise as currently healing and denies significant associated pain.          MEDS: Current Medications[1]    OBJECTIVE:   Vitals:    06/25/25 0926   BP: 119/80   BP Location: Left arm   Patient Position: Sitting   Pulse: 104   SpO2: 96%   Weight: 86.6 kg (190 lb 14.7 oz)   Height: 5' 2" (1.575 m)     Body mass index is 34.92 kg/m².      Physical Exam    Constitutional: Normal appearance.  HENT: Normocephalic.  Eyes: No discharge bilaterally. Extraocular movements intact.  Cardiovascular: Normal rate and regular rhythm. Normal heart sounds.  Pulmonary: Pulmonary effort is normal. Normal breath sounds.  Abdominal: Abdomen is flat. Bowel sounds are normal. No distension. Abdomen is soft. No mass. No tenderness. No guarding. No rebound. No hernia is present. Bladder not distended on exam.  Skin: Warm. Dry.  Neurological: Alert.  Psychiatric: Behavior normal.  Vitals: Weight: 190 lbs. Normal blood pressure. Elevated heart rate.  Neck: All normal.          LABS:   A1C:  Recent Labs   Lab 06/25/24  1029   Hemoglobin A1C 5.4  5.4     CBC:  Recent Labs   Lab 06/25/25  1000   WBC 2.94 L   RBC 4.77   HGB 12.8   HCT 40.2   Platelet Count 188   MCV 84   MCH 26.8 L   MCHC 31.8 L     CMP:  Recent Labs   Lab 06/25/25  1000   Glucose 90   Calcium 9.7   Albumin 4.0   Protein Total 7.7   Sodium 145   Potassium 4.6   CO2 27   Chloride 109   BUN 11   Creatinine 0.8   ALP 69   ALT 20   AST 20   Bilirubin Total 0.5     LIPIDS:  Recent Labs   Lab 06/25/24  1029 06/25/25  1000   TSH 0.887  --    HDL 68  --    HDL Cholesterol  --  64   Cholesterol Total  --  142   Cholesterol 166  --    Triglycerides 47  --    Triglyceride  --  33   LDL Cholesterol 88.6 71.4   HDL/Cholesterol Ratio 41.0 45.1   Non-HDL Cholesterol 98  --    Non HDL Cholesterol  --  78   Total Cholesterol/HDL Ratio 2.4  --    Cholesterol/HDL Ratio  --  2.2     TSH:  Recent Labs   Lab 06/25/24  1029   TSH 0.887 " "        ASSESSMENT & PLAN:    Problem List Items Addressed This Visit          Neuro    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 Continue current medical therapy. Advised not to take meloxicam with other NSAIDs           Relevant Medications    gabapentin (NEURONTIN) 300 MG capsule    meloxicam (MOBIC) 15 MG tablet       Oncology    History of breast cancer (Chronic)    Overview   S/p chemo/lumpectomy. Mammogram UTD            Endocrine    Class 1 obesity due to excess calories without serious comorbidity with body mass index (BMI) of 34.0 to 34.9 in adult (Chronic)    Overview   Body mass index is 34.7 kg/m².  Continue diet/exercise efforts. Discussed balancing macro nutrients and varying exercise routine.            Pre-diabetes (Chronic)    Overview   Last A1c is 5.4 on 06/25/2024  Repeat today. Continue diet/lifestyle efforts           Other Visit Diagnoses         Annual physical exam    -  Preventative cares discussed and updated as needed. Lifestyle modifications discussed as needed.      Relevant Orders    CBC Auto Differential (Completed)    Comprehensive Metabolic Panel (Completed)    Hemoglobin A1C    Lipid Panel (Completed)        - Monitored patient's weight at 190 lbs, which is her heaviest ever (previously 180s last year and 170s the year before).  - Evaluated her diet, which consists mainly of grilled chicken, salads, homemade lemonade and water, with no fried foods, sweets, or sodas.  - Assessed potential low macro/micronutrients affecting metabolism and explained body's adaptive response to calorie restriction.  - Recommend logging food intake using MyFitnessPal for calorie and nutrient analysis, adding metabolism-boosting "superfoods," and balancing cardio with strength training using resistance bands and online exercise videos to stimulate metabolism and aid weight loss.    Follow up in about 1 year (around 6/25/2026) for annual. At [place]     RTC/ED " precautions discussed where applicable.   Encouraged patient to let me know if there are any further questions/concerns.     Advise patient/caretaker to check with insurance regarding orders to avoid unexpected fees/costs.     The patient/caretaker indicates understanding of these issues and agrees with the plan.    This note was generated with the assistance of ambient listening technology. I attest to having reviewed and edited the generated note for accuracy, though some syntax or spelling errors may persist. Please contact the author of this note for any clarification.      Dr. Holland Mcpherson MD  Family Medicine       [1]   Current Outpatient Medications:     gabapentin (NEURONTIN) 300 MG capsule, Take 1 capsule (300 mg total) by mouth 3 (three) times daily., Disp: 90 capsule, Rfl: 3    meloxicam (MOBIC) 15 MG tablet, Take 1 tablet (15 mg total) by mouth once daily., Disp: 90 tablet, Rfl: 3

## 2025-06-25 NOTE — PATIENT INSTRUCTIONS
Ashlyn Prince - [email or web address]; facebook page     Eat like a girl     High nutrient foods:   -berries - high fiber  -spinach, kale, broccoli   -salmon   -avocados  -nuts - walnuts, brazil nuts  -pomegranate   -quinoa  -Turmeric, randal, cinnamon    Make sure you are balancing complex carbs, protein and fats

## 2025-08-08 LAB — BCS RECOMMENDATION EXT: NORMAL

## 2025-08-19 ENCOUNTER — PATIENT MESSAGE (OUTPATIENT)
Dept: ADMINISTRATIVE | Facility: HOSPITAL | Age: 60
End: 2025-08-19
Payer: COMMERCIAL

## 2025-08-20 ENCOUNTER — PATIENT OUTREACH (OUTPATIENT)
Dept: ADMINISTRATIVE | Facility: HOSPITAL | Age: 60
End: 2025-08-20
Payer: COMMERCIAL